# Patient Record
Sex: FEMALE | Race: BLACK OR AFRICAN AMERICAN | NOT HISPANIC OR LATINO | URBAN - METROPOLITAN AREA
[De-identification: names, ages, dates, MRNs, and addresses within clinical notes are randomized per-mention and may not be internally consistent; named-entity substitution may affect disease eponyms.]

---

## 2018-04-08 ENCOUNTER — INPATIENT (INPATIENT)
Facility: HOSPITAL | Age: 83
LOS: 4 days | Discharge: HOME | DRG: 270 | End: 2018-04-13
Attending: HOSPITALIST | Admitting: INTERNAL MEDICINE
Payer: MEDICAID

## 2018-04-08 VITALS
SYSTOLIC BLOOD PRESSURE: 141 MMHG | RESPIRATION RATE: 20 BRPM | OXYGEN SATURATION: 98 % | DIASTOLIC BLOOD PRESSURE: 87 MMHG | HEART RATE: 95 BPM

## 2018-04-08 DIAGNOSIS — I21.4 NON-ST ELEVATION (NSTEMI) MYOCARDIAL INFARCTION: ICD-10-CM

## 2018-04-08 LAB
ALBUMIN SERPL ELPH-MCNC: 3.9 G/DL — SIGNIFICANT CHANGE UP (ref 3.3–5)
ALP SERPL-CCNC: 61 U/L — SIGNIFICANT CHANGE UP (ref 40–120)
ALT FLD-CCNC: 85 U/L RC — HIGH (ref 10–45)
ANION GAP SERPL CALC-SCNC: 15 MMOL/L — SIGNIFICANT CHANGE UP (ref 5–17)
ANION GAP SERPL CALC-SCNC: 17 MMOL/L — SIGNIFICANT CHANGE UP (ref 5–17)
APTT BLD: 129 SEC — CRITICAL HIGH (ref 27.5–37.4)
APTT BLD: 28.7 SEC — SIGNIFICANT CHANGE UP (ref 27.5–37.4)
AST SERPL-CCNC: 114 U/L — HIGH (ref 10–40)
BASOPHILS # BLD AUTO: 0 K/UL — SIGNIFICANT CHANGE UP (ref 0–0.2)
BASOPHILS # BLD AUTO: 0 K/UL — SIGNIFICANT CHANGE UP (ref 0–0.2)
BASOPHILS NFR BLD AUTO: 0.3 % — SIGNIFICANT CHANGE UP (ref 0–2)
BASOPHILS NFR BLD AUTO: 0.4 % — SIGNIFICANT CHANGE UP (ref 0–2)
BILIRUB SERPL-MCNC: 0.9 MG/DL — SIGNIFICANT CHANGE UP (ref 0.2–1.2)
BUN SERPL-MCNC: 16 MG/DL — SIGNIFICANT CHANGE UP (ref 7–23)
BUN SERPL-MCNC: 17 MG/DL — SIGNIFICANT CHANGE UP (ref 7–23)
CALCIUM SERPL-MCNC: 9.4 MG/DL — SIGNIFICANT CHANGE UP (ref 8.4–10.5)
CALCIUM SERPL-MCNC: 9.9 MG/DL — SIGNIFICANT CHANGE UP (ref 8.4–10.5)
CHLORIDE SERPL-SCNC: 92 MMOL/L — LOW (ref 96–108)
CHLORIDE SERPL-SCNC: 96 MMOL/L — SIGNIFICANT CHANGE UP (ref 96–108)
CHOLEST SERPL-MCNC: 243 MG/DL — HIGH (ref 10–199)
CK MB BLD-MCNC: 4.6 % — HIGH (ref 0–3.5)
CK MB BLD-MCNC: 5.5 % — HIGH (ref 0–3.5)
CK MB CFR SERPL CALC: 19.7 NG/ML — HIGH (ref 0–3.8)
CK MB CFR SERPL CALC: 25.5 NG/ML — HIGH (ref 0–3.8)
CK SERPL-CCNC: 430 U/L — HIGH (ref 25–170)
CK SERPL-CCNC: 467 U/L — HIGH (ref 25–170)
CO2 SERPL-SCNC: 24 MMOL/L — SIGNIFICANT CHANGE UP (ref 22–31)
CO2 SERPL-SCNC: 26 MMOL/L — SIGNIFICANT CHANGE UP (ref 22–31)
CREAT SERPL-MCNC: 0.84 MG/DL — SIGNIFICANT CHANGE UP (ref 0.5–1.3)
CREAT SERPL-MCNC: 0.87 MG/DL — SIGNIFICANT CHANGE UP (ref 0.5–1.3)
EOSINOPHIL # BLD AUTO: 0.1 K/UL — SIGNIFICANT CHANGE UP (ref 0–0.5)
EOSINOPHIL # BLD AUTO: 0.2 K/UL — SIGNIFICANT CHANGE UP (ref 0–0.5)
EOSINOPHIL NFR BLD AUTO: 0.8 % — SIGNIFICANT CHANGE UP (ref 0–6)
EOSINOPHIL NFR BLD AUTO: 2.3 % — SIGNIFICANT CHANGE UP (ref 0–6)
GLUCOSE SERPL-MCNC: 124 MG/DL — HIGH (ref 70–99)
GLUCOSE SERPL-MCNC: 152 MG/DL — HIGH (ref 70–99)
HBA1C BLD-MCNC: 7.2 % — HIGH (ref 4–5.6)
HCT VFR BLD CALC: 40.8 % — SIGNIFICANT CHANGE UP (ref 34.5–45)
HCT VFR BLD CALC: 41.8 % — SIGNIFICANT CHANGE UP (ref 34.5–45)
HCT VFR BLD CALC: 43.9 % — SIGNIFICANT CHANGE UP (ref 34.5–45)
HDLC SERPL-MCNC: 54 MG/DL — SIGNIFICANT CHANGE UP (ref 40–125)
HGB BLD-MCNC: 13.5 G/DL — SIGNIFICANT CHANGE UP (ref 11.5–15.5)
HGB BLD-MCNC: 13.8 G/DL — SIGNIFICANT CHANGE UP (ref 11.5–15.5)
HGB BLD-MCNC: 14.5 G/DL — SIGNIFICANT CHANGE UP (ref 11.5–15.5)
INR BLD: 1.21 RATIO — HIGH (ref 0.88–1.16)
INR BLD: 1.26 RATIO — HIGH (ref 0.88–1.16)
LIPID PNL WITH DIRECT LDL SERPL: 177 MG/DL — HIGH
LYMPHOCYTES # BLD AUTO: 2.1 K/UL — SIGNIFICANT CHANGE UP (ref 1–3.3)
LYMPHOCYTES # BLD AUTO: 26.8 % — SIGNIFICANT CHANGE UP (ref 13–44)
LYMPHOCYTES # BLD AUTO: 3.8 K/UL — HIGH (ref 1–3.3)
LYMPHOCYTES # BLD AUTO: 49.5 % — HIGH (ref 13–44)
MAGNESIUM SERPL-MCNC: 2.1 MG/DL — SIGNIFICANT CHANGE UP (ref 1.6–2.6)
MCHC RBC-ENTMCNC: 29.7 PG — SIGNIFICANT CHANGE UP (ref 27–34)
MCHC RBC-ENTMCNC: 29.9 PG — SIGNIFICANT CHANGE UP (ref 27–34)
MCHC RBC-ENTMCNC: 30.7 PG — SIGNIFICANT CHANGE UP (ref 27–34)
MCHC RBC-ENTMCNC: 32.4 GM/DL — SIGNIFICANT CHANGE UP (ref 32–36)
MCHC RBC-ENTMCNC: 33.1 GM/DL — SIGNIFICANT CHANGE UP (ref 32–36)
MCHC RBC-ENTMCNC: 33.8 GM/DL — SIGNIFICANT CHANGE UP (ref 32–36)
MCV RBC AUTO: 90.3 FL — SIGNIFICANT CHANGE UP (ref 80–100)
MCV RBC AUTO: 90.8 FL — SIGNIFICANT CHANGE UP (ref 80–100)
MCV RBC AUTO: 91.8 FL — SIGNIFICANT CHANGE UP (ref 80–100)
MONOCYTES # BLD AUTO: 0.8 K/UL — SIGNIFICANT CHANGE UP (ref 0–0.9)
MONOCYTES # BLD AUTO: 0.9 K/UL — SIGNIFICANT CHANGE UP (ref 0–0.9)
MONOCYTES NFR BLD AUTO: 11.9 % — SIGNIFICANT CHANGE UP (ref 2–14)
MONOCYTES NFR BLD AUTO: 9.9 % — SIGNIFICANT CHANGE UP (ref 2–14)
NEUTROPHILS # BLD AUTO: 2.8 K/UL — SIGNIFICANT CHANGE UP (ref 1.8–7.4)
NEUTROPHILS # BLD AUTO: 4.8 K/UL — SIGNIFICANT CHANGE UP (ref 1.8–7.4)
NEUTROPHILS NFR BLD AUTO: 36.1 % — LOW (ref 43–77)
NEUTROPHILS NFR BLD AUTO: 62.1 % — SIGNIFICANT CHANGE UP (ref 43–77)
PHOSPHATE SERPL-MCNC: 3 MG/DL — SIGNIFICANT CHANGE UP (ref 2.5–4.5)
PLATELET # BLD AUTO: 162 K/UL — SIGNIFICANT CHANGE UP (ref 150–400)
PLATELET # BLD AUTO: 181 K/UL — SIGNIFICANT CHANGE UP (ref 150–400)
PLATELET # BLD AUTO: 187 K/UL — SIGNIFICANT CHANGE UP (ref 150–400)
POTASSIUM SERPL-MCNC: 3.8 MMOL/L — SIGNIFICANT CHANGE UP (ref 3.5–5.3)
POTASSIUM SERPL-MCNC: 7.4 MMOL/L — CRITICAL HIGH (ref 3.5–5.3)
POTASSIUM SERPL-SCNC: 3.8 MMOL/L — SIGNIFICANT CHANGE UP (ref 3.5–5.3)
POTASSIUM SERPL-SCNC: 7.4 MMOL/L — CRITICAL HIGH (ref 3.5–5.3)
PROT SERPL-MCNC: 8.2 G/DL — SIGNIFICANT CHANGE UP (ref 6–8.3)
PROTHROM AB SERPL-ACNC: 13.2 SEC — HIGH (ref 9.8–12.7)
PROTHROM AB SERPL-ACNC: 13.8 SEC — HIGH (ref 9.8–12.7)
RBC # BLD: 4.49 M/UL — SIGNIFICANT CHANGE UP (ref 3.8–5.2)
RBC # BLD: 4.55 M/UL — SIGNIFICANT CHANGE UP (ref 3.8–5.2)
RBC # BLD: 4.86 M/UL — SIGNIFICANT CHANGE UP (ref 3.8–5.2)
RBC # FLD: 13 % — SIGNIFICANT CHANGE UP (ref 10.3–14.5)
RBC # FLD: 13.1 % — SIGNIFICANT CHANGE UP (ref 10.3–14.5)
RBC # FLD: 13.1 % — SIGNIFICANT CHANGE UP (ref 10.3–14.5)
SODIUM SERPL-SCNC: 135 MMOL/L — SIGNIFICANT CHANGE UP (ref 135–145)
SODIUM SERPL-SCNC: 135 MMOL/L — SIGNIFICANT CHANGE UP (ref 135–145)
TOTAL CHOLESTEROL/HDL RATIO MEASUREMENT: 4.5 RATIO — SIGNIFICANT CHANGE UP (ref 3.3–7.1)
TRIGL SERPL-MCNC: 58 MG/DL — SIGNIFICANT CHANGE UP (ref 10–149)
TROPONIN T SERPL-MCNC: 0.91 NG/ML — HIGH (ref 0–0.06)
TROPONIN T SERPL-MCNC: 2.99 NG/ML — HIGH (ref 0–0.06)
TSH SERPL-MCNC: 2.12 UIU/ML — SIGNIFICANT CHANGE UP (ref 0.27–4.2)
WBC # BLD: 7.8 K/UL — SIGNIFICANT CHANGE UP (ref 3.8–10.5)
WBC # BLD: 7.8 K/UL — SIGNIFICANT CHANGE UP (ref 3.8–10.5)
WBC # BLD: 8.2 K/UL — SIGNIFICANT CHANGE UP (ref 3.8–10.5)
WBC # FLD AUTO: 7.8 K/UL — SIGNIFICANT CHANGE UP (ref 3.8–10.5)
WBC # FLD AUTO: 7.8 K/UL — SIGNIFICANT CHANGE UP (ref 3.8–10.5)
WBC # FLD AUTO: 8.2 K/UL — SIGNIFICANT CHANGE UP (ref 3.8–10.5)

## 2018-04-08 PROCEDURE — 93010 ELECTROCARDIOGRAM REPORT: CPT

## 2018-04-08 PROCEDURE — 71045 X-RAY EXAM CHEST 1 VIEW: CPT | Mod: 26

## 2018-04-08 PROCEDURE — 76937 US GUIDE VASCULAR ACCESS: CPT | Mod: 26

## 2018-04-08 PROCEDURE — 33967 INSERT I-AORT PERCUT DEVICE: CPT

## 2018-04-08 PROCEDURE — 93458 L HRT ARTERY/VENTRICLE ANGIO: CPT | Mod: 26,59

## 2018-04-08 PROCEDURE — 99291 CRITICAL CARE FIRST HOUR: CPT

## 2018-04-08 PROCEDURE — 92941 PRQ TRLML REVSC TOT OCCL AMI: CPT | Mod: LD

## 2018-04-08 RX ORDER — TICAGRELOR 90 MG/1
180 TABLET ORAL ONCE
Qty: 0 | Refills: 0 | Status: COMPLETED | OUTPATIENT
Start: 2018-04-08 | End: 2018-04-08

## 2018-04-08 RX ORDER — ATORVASTATIN CALCIUM 80 MG/1
80 TABLET, FILM COATED ORAL AT BEDTIME
Qty: 0 | Refills: 0 | Status: DISCONTINUED | OUTPATIENT
Start: 2018-04-08 | End: 2018-04-12

## 2018-04-08 RX ORDER — ASPIRIN/CALCIUM CARB/MAGNESIUM 324 MG
81 TABLET ORAL DAILY
Qty: 0 | Refills: 0 | Status: DISCONTINUED | OUTPATIENT
Start: 2018-04-08 | End: 2018-04-13

## 2018-04-08 RX ORDER — FUROSEMIDE 40 MG
40 TABLET ORAL ONCE
Qty: 0 | Refills: 0 | Status: COMPLETED | OUTPATIENT
Start: 2018-04-08 | End: 2018-04-08

## 2018-04-08 RX ORDER — CLOPIDOGREL BISULFATE 75 MG/1
75 TABLET, FILM COATED ORAL DAILY
Qty: 0 | Refills: 0 | Status: DISCONTINUED | OUTPATIENT
Start: 2018-04-08 | End: 2018-04-13

## 2018-04-08 RX ORDER — HEPARIN SODIUM 5000 [USP'U]/ML
INJECTION INTRAVENOUS; SUBCUTANEOUS
Qty: 25000 | Refills: 0 | Status: DISCONTINUED | OUTPATIENT
Start: 2018-04-08 | End: 2018-04-09

## 2018-04-08 RX ORDER — HEPARIN SODIUM 5000 [USP'U]/ML
3000 INJECTION INTRAVENOUS; SUBCUTANEOUS EVERY 6 HOURS
Qty: 0 | Refills: 0 | Status: DISCONTINUED | OUTPATIENT
Start: 2018-04-08 | End: 2018-04-09

## 2018-04-08 RX ORDER — HEPARIN SODIUM 5000 [USP'U]/ML
6500 INJECTION INTRAVENOUS; SUBCUTANEOUS EVERY 6 HOURS
Qty: 0 | Refills: 0 | Status: DISCONTINUED | OUTPATIENT
Start: 2018-04-08 | End: 2018-04-09

## 2018-04-08 RX ADMIN — HEPARIN SODIUM 1400 UNIT(S)/HR: 5000 INJECTION INTRAVENOUS; SUBCUTANEOUS at 16:40

## 2018-04-08 RX ADMIN — TICAGRELOR 180 MILLIGRAM(S): 90 TABLET ORAL at 12:18

## 2018-04-08 RX ADMIN — ATORVASTATIN CALCIUM 80 MILLIGRAM(S): 80 TABLET, FILM COATED ORAL at 23:18

## 2018-04-08 RX ADMIN — Medication 40 MILLIGRAM(S): at 12:10

## 2018-04-08 RX ADMIN — HEPARIN SODIUM 0 UNIT(S)/HR: 5000 INJECTION INTRAVENOUS; SUBCUTANEOUS at 23:59

## 2018-04-08 NOTE — H&P ADULT - NSHPLABSRESULTS_GEN_ALL_CORE
14.5   7.8   )-----------( 187      ( 08 Apr 2018 16:46 )             43.9     04-08    135  |  92<L>  |  17  ----------------------------<  152<H>  3.8   |  26  |  0.87    Ca    9.9      08 Apr 2018 16:46  Phos  3.0     04-08  Mg     2.1     04-08    TPro  8.2  /  Alb  3.9  /  TBili  0.9  /  DBili  x   /  AST  114<H>  /  ALT  85<H>  /  AlkPhos  61  04-08      PT/INR - ( 08 Apr 2018 17:21 )   PT: 13.8 sec;   INR: 1.26 ratio    PTT - ( 08 Apr 2018 12:10 )  PTT:28.7 sec 14.5   7.8   )-----------( 187      ( 08 Apr 2018 16:46 )             43.9     04-08    135  |  92<L>  |  17  ----------------------------<  152<H>  3.8   |  26  |  0.87    Ca    9.9      08 Apr 2018 16:46  Phos  3.0     04-08  Mg     2.1     04-08    TPro  8.2  /  Alb  3.9  /  TBili  0.9  /  DBili  x   /  AST  114<H>  /  ALT  85<H>  /  AlkPhos  61  04-08    PT/INR - ( 08 Apr 2018 17:21 )   PT: 13.8 sec;   INR: 1.26 ratio    PTT - ( 08 Apr 2018 12:10 )  PTT:28.7 sec

## 2018-04-08 NOTE — ED PROVIDER NOTE - OBJECTIVE STATEMENT
Intermittant L chest pain 4 days worse since yest associated w sob. epigastric before now L chest. no vomiting or diaphoresis. got asa, plavix and has nitro patch on chest now. sent for cath from [facility] , cards as accepting. DM HTN Intermittant L chest pain 1-2 days worse since yest associated w sob. epigastric before now L chest. no vomiting or diaphoresis. got asa, plavix and has nitro patch on chest now. sent for cath from [facility] , cards as accepting.

## 2018-04-08 NOTE — H&P ADULT - NSHPPHYSICALEXAM_GEN_ALL_CORE
ICU Vital Signs Last 24 Hrs  T(C): 36.8 (08 Apr 2018 20:00), Max: 36.8 (08 Apr 2018 20:00)  T(F): 98.3 (08 Apr 2018 20:00), Max: 98.3 (08 Apr 2018 20:00)  HR: 98 (08 Apr 2018 21:00) (88 - 101)  BP: 132/76 (08 Apr 2018 14:45) (125/63 - 154/98)  BP(mean): 100 (08 Apr 2018 14:45) (79 - 100)  ABP: --  ABP(mean): --  RR: 22 (08 Apr 2018 21:00) (18 - 22)  SpO2: 95% (08 Apr 2018 21:00) (92% - 100%)      GENERAL: AOx3, resting comfortably, mild distress due to left leg cramp   HEENT: pupils equal & reactive, no scleral icterus  NECK: supple, no JVD appreciated   CARDIAC: S1 & S2 present, regular rhythm, no murmurs/rubs/gallops  CHEST: breath sounds clear & equal anteriorly, no wheezes, no crackles at bases   ABDOMEN: Soft, non-distended, bowel sounds present, non-tender   NEURO: CN II-XII grossly intact, no focal deficits  MSK: Strength appropriate in 3/4 extremities +RLE immobilized with IABP +LLE muscle spasm  SKIN: Warm and dry +IABP in R. groin without bleeding at site   EXT: Cool below ankle, no LE edema, 1+ DP B/L

## 2018-04-08 NOTE — ED PROVIDER NOTE - PHYSICAL EXAMINATION
Gen: well appearing, of stated age, no acute distress; Head: NC, AT; ENT: MMM, no uvular deviation; Neck: supple with full ROM; Chest: CTAB, no retractions, rate normal, appears to breath comfortable, Nitro Patch; Heart: tachy reg S1S2 +JVD minimal peripheral edema b/l pulses 2+ in arms and legs; Abd: Soft non-tender, no rebound or guarding, no masses, no jimenes sign, no mcburney tenderness, no CVAT; Back: No spinal deformity; Ext: Moving all 4 limbs without obvious impairment to ROM, no obvious weakness; Neuro: fluid speech, no focal deficits, oriented to person, place, situation; Psych: No anxiety, depression or pressured speech noted; Skin: no utricaria, no diffuse rash. -ncohen Gen: well appearing, of stated age, no acute distress; Head: NC, AT; ENT: MMM, no uvular deviation; Neck: supple with full ROM; Chest: CTAB except basilar rales bl, no retractions, rate normal, appears to breath comfortable, Nitro Patch; Heart: tachy reg S1S2 +JVD minimal peripheral edema b/l pulses 2+ in arms and legs; Abd: Soft non-tender, no rebound or guarding, no masses, no jimenes sign, no mcburney tenderness, no CVAT; Back: No spinal deformity; Ext: Moving all 4 limbs without obvious impairment to ROM, no obvious weakness; Neuro: fluid speech, no focal deficits, oriented to person, place, situation; Psych: No anxiety, depression or pressured speech noted; Skin: no utricaria, no diffuse rash. -ncohen

## 2018-04-08 NOTE — H&P ADULT - HISTORY OF PRESENT ILLNESS
85F with history of pre-diabetes and HTN presents as transfer from [facility] for STEMI and emergent LHC. Patient initially presented to OSH for shortness of breath accompanied by epigastric abdominal pain for one day duration. Patient lives with son who provided translation at bedside. He noted patient gasping for breath yesterday afternoon complaining of dyspepsia and  epigastric abdominal pain temporarily relieved with Maalox. Patient had similar symptoms approx. 2 weeks ago that also resolved with OTC management. At 4 am today, son noted patient to have difficulty with breathing, appearing in more distress than earlier that day. She had not experienced subjective fevers, chills, nausea, vomiting or changes in appetite. She had intermittent chest pain approx. 1 month ago and went to PMD where EKG reportedly did not show anything of concern. She is a non-smoker with no prior cardiac history. Family history non-contributory.      At [place], patient endorsed left-sided chest pain along with shortness of breath, found with Q waves in leads III, avF as well as TWI in lead II,III and avF. Patient transferred to [facility] Cath lab and underwent PCI with RUBEN x2 to pLAD via R. femoral access. LHC also revealed EF 30% and LVEDP 35 for which she was given Lasix 40 IV x1. Cardiac enzymes markedly elevated upon transfer to CCU however symptoms of left-sided chest pain, epigastric discomfort and shortness of breath had resolved. Patient endorsed left lower extremity 85F with history of pre-diabetes and HTN presents as transfer from [facility] for STEMI and emergent LHC. Patient initially presented to OSH for shortness of breath accompanied by epigastric abdominal pain for one day duration. Patient lives with son who provided translation at bedside. He noted patient gasping for breath yesterday afternoon complaining of dyspepsia and  epigastric abdominal pain temporarily relieved with Maalox. Patient had similar symptoms approx. 2 weeks ago that also resolved with OTC management. At 4 am today, son noted patient to have difficulty with breathing, appearing in more distress than earlier that day. She had not experienced subjective fevers, chills, nausea, vomiting or changes in appetite. She had intermittent chest pain approx. 1 month ago and went to PMD where EKG reportedly did not show anything of concern. She is a non-smoker with no prior cardiac history. Family history non-contributory.      At [place], patient endorsed left-sided chest pain along with shortness of breath, found with Q waves in leads III, avF as well as TWI in lead II,III and avF. Patient given ASA/Plavix load, and nitroglycerin patch. Patient transferred to [facility] Cath lab and [facility]. LHC also revealed EF 30% and LVEDP 35 for which she was given Lasix 40 IV x1. Cardiac enzymes markedly elevated upon transfer to CCU however symptoms of left-sided chest pain, epigastric discomfort and shortness of breath had resolved. Patient endorsed left lower extremity 85F with history of pre-diabetes and HTN presents as transfer from [facility] for NSTEMI and emergent LHC. Patient initially presented to OSH for shortness of breath accompanied by epigastric abdominal pain for one day duration. Patient lives with son who provided translation at bedside. He noted patient gasping for breath yesterday afternoon complaining of dyspepsia and  epigastric abdominal pain temporarily relieved with Maalox. Patient had similar symptoms approx. 2 weeks ago that also resolved with OTC management. At 4 am today, son noted patient to have difficulty with breathing, appearing in more distress than earlier that day. She had not experienced subjective fevers, chills, nausea, vomiting or changes in appetite. She had intermittent chest pain approx. 1 month ago and went to PMD where EKG reportedly did not show anything of concern. She is a non-smoker with no prior cardiac history. Family history non-contributory.      At [place], patient endorsed left-sided chest pain along with shortness of breath, found with Q waves in leads III, avF as well as TWI in lead II,III and avF. Patient given ASA/Plavix load, and nitroglycerin patch. Patient transferred to [facility] Cath lab and [facility]. LHC also revealed EF 30% and LVEDP 35 for which she was given Lasix 40 IV x1. Cardiac enzymes markedly elevated upon transfer to CCU however symptoms of left-sided chest pain, epigastric discomfort and shortness of breath had resolved. Patient endorsed left lower extremity 85 Creole-speaking woman with history of pre-diabetes and HTN presents as transfer from [facility] for NSTEMI and emergent LHC. Patient initially presented to OSH for shortness of breath accompanied by epigastric abdominal pain for one day duration. Patient lives with son who provided translation at bedside. He noted patient gasping for breath yesterday afternoon complaining of dyspepsia and  epigastric abdominal pain temporarily relieved with Maalox. Patient had similar symptoms approx. 2 weeks ago that also resolved with OTC management. At 4 am today, son noted patient to have difficulty with breathing, appearing in more distress than earlier that day. She had not experienced subjective fevers, chills, nausea, vomiting or changes in appetite. She had intermittent chest pain approx. 1 month ago and went to PMD where EKG reportedly did not show anything of concern. She is a non-smoker with no prior cardiac history. Family history non-contributory.      At [place], patient endorsed left-sided chest pain along with shortness of breath, found with Q waves in leads III, avF as well as TWI in lead II,III and avF. Patient given ASA/Plavix load, and nitroglycerin patch. Patient transferred to [facility] Cath lab and underwent PCI with RUBNE x2 to [facility] via R. femoral access. LHC also revealed EF 30% and LVEDP 35 for which she was given Lasix 40 IV x1. Cardiac enzymes markedly elevated upon transfer to CCU --> 467; trop 0.91 --> 2.99, CKMB 19.1 --> 25.5.  Left-sided chest pain, epigastric discomfort and shortness of breath now resolved. Patient only complaint is left lower extremity muscle spasm with pain. 85 Creole-speaking woman with history of pre-diabetes and HTN presents as transfer from [facility] for NSTEMI and emergent LHC. Patient initially presented to OSH for shortness of breath accompanied by epigastric abdominal pain for one day duration. Patient lives with son who provided translation at bedside. He noted patient gasping for breath yesterday afternoon complaining of dyspepsia and  epigastric abdominal pain temporarily relieved with Maalox. Patient had similar symptoms approx. 2 weeks ago that also resolved with OTC management. At 4 am today, son noted patient to have difficulty with breathing, appearing in more distress than earlier that day. She had not experienced subjective fevers, chills, nausea, vomiting or changes in appetite. She had intermittent chest pain approx. 1 month ago and went to PMD where EKG reportedly did not show anything of concern. She is a non-smoker with no prior cardiac history. Family history non-contributory.      At [place], patient endorsed left-sided chest pain along with shortness of breath, found with Q waves in leads III, avF; TWI in lead II,III, avF. V5-V6. Patient given ASA/Plavix load and nitroglycerin patch prior to transfer to [facility]. Patient  underwent PCI with RUBEN x2 to [facility] via R. femoral access with placement of intra-aortic balloon pump. LHC also revealed EF 30% and LVEDP 35 for which she was given Lasix 40 IV x1. Cardiac enzymes markedly elevated upon transfer to CCU --> 467; trop 0.91 --> 2.99, CKMB 19.1 --> 25.5.  Left-sided chest pain, epigastric discomfort and shortness of breath now resolved. Patient only complaint is left lower extremity muscle spasm with pain.

## 2018-04-08 NOTE — ED PROVIDER NOTE - NS ED ROS FT
ROS: As noted in HPI, otherwise below --  Constitutional symptoms: Negative  Eyes: Negative  Ears, Nose, Mouth, Throat: Negative  Cardiovascular: +  Respiratory: +  Gastrointestinal: Negative  Genitourinary: Negative  Musculoskeletal: Negative  Integumentary: Negative  Neurological: Negative  Psychiatric: Negative  Endocrine: Negative  Allergic/Immunologic: Negative

## 2018-04-08 NOTE — H&P ADULT - NSHPSOCIALHISTORY_GEN_ALL_CORE
Patient [place]-born, immigrated to [place] years ago. Lifelong non-smoker, no alcohol use or other toxic habits. Resides at home with son and daughter-in-law, capable of all basic ADLs at baseline.

## 2018-04-08 NOTE — ED PROVIDER NOTE - MEDICAL DECISION MAKING DETAILS
concern for nstemi, cards at bedside for cath consultation. ekg, repeat trop, review [facility] ekg - this one is concerning for STEMI however ekg now is not stemi.

## 2018-04-08 NOTE — ED ADULT NURSE NOTE - OBJECTIVE STATEMENT
pt transferred from [place] in [place] for c/o intermittant L sided chest pain for past 2 dasy. pt was transferred for catherization. pt receiced asa, plavix @ [facility]. pt is creole speaking. pt was accepted by cardiology. pt transferred from [place] in [place] for c/o intermittant L sided chest pain for past 2 dasy. pt was transferred for catherization. pt receiced asa, plavix @ [facility]. pt is creole speaking. pt was accepted by cardiology. iv placed L hand labs obtained and sent pt med with lasix and brilinta as ordered. pt seen by cardiology fellow. pt transported to cath lab via [facility] on CM  @12:20

## 2018-04-08 NOTE — ED PROVIDER NOTE - PROGRESS NOTE DETAILS
d/w cards here at bedside. given that pt already treated and still w pain will go to cath lab. advised for poss aortogram. active ekg changes from [facility].

## 2018-04-08 NOTE — H&P ADULT - ASSESSMENT
85 Creole-speaking woman with history of pre-diabetes and HTN presents as transfer from [facility] for NSTEMI and emergent LHC revealing triple vessel disease with RUBEN x2 to pLAD and IABP placement pending possible staged PCI.     # Cardio    # Pulm    # Renal    # MSK  Left leg spasms - etiology unclear, replete     # Endo  Pre-diabetes - not on oral medications at home, HbA1c     #DVT ppx: On heparin drip for 85 Creole-speaking woman with history of pre-diabetes and HTN presents as transfer from [facility] for NSTEMI and emergent LHC revealing triple vessel disease with RUBEN x2 to pLAD and IABP placement pending possible staged PCI.     # Cardio  NSTEMI - TWI in II, III and avF with Q waves therefore likely late-presentation, LHC revealed triple-vessel disease, RUBEN x2 to pLAD with possible staged PCI planned  - IABP placed in R. groin given triple vessel disease with good augmentation of blood pressures   - s/p ASA and Plavix load at OSH; cont ASA 81, Plavix 75 as patient on heparin drip for IABP  - needs ACEi, beta blocker    # Pulm    # Renal    # MSK  Left leg spasms - etiology unclear, replete     # Endo  Pre-diabetes - not on oral medications at home, HbA1c     #DVT ppx: On heparin drip for 85 Creole-speaking woman with history of pre-diabetes and HTN presents as transfer from [facility] for NSTEMI and emergent LHC revealing triple vessel disease with RUBEN x2 to pLAD and IABP placement pending possible staged PCI.     # Cardio  NSTEMI - TWI in II, III and avF with Q waves therefore likely late-presentation, LHC revealed triple-vessel disease, RUBEN x2 to pLAD with possible staged PCI planned  - IABP placed in R. groin given triple vessel disease with good augmentation of blood pressures; cont heparin drip    - s/p ASA and Plavix load at OSH; cont ASA 81, Plavix 75 as patient on heparin drip for IABP  - needs ACEi, beta blocker    # Pulm  Dyspnea - likely secondary to volume overload in setting of NSTEMI with elevated LVEDP 30, given Lasix 40 IV with resolution  - infectious etiology less likely given absence of cough, infiltrates on CXR    # MSK  Left leg spasms - etiology unclear, resolved spontaneously; cont monitor, replete electrolytes PRN    # Endo  Pre-diabetes - not on oral medications at home, HbA1c 6.2; monitor FSG with ISS    #DVT ppx: anticoagulated with heparin drip     P Hudson SANTANA-PGY2  CCU/Internal Medicine 85 Creole-speaking woman with history of pre-diabetes and HTN presents as transfer from [facility] for NSTEMI and emergent LHC revealing triple vessel disease with RUBEN x2 to pLAD and IABP placement pending possible staged PCI.     # Cardio  NSTEMI - TWI in II, III and avF with Q waves possibly late-presentation; LHC revealed triple-vessel disease s/p RUBEN x2 to pLAD with possible staged PCI planned  - IABP placed in R. groin in setting of triple vessel disease, good augmentation of blood pressures; cont heparin drip    - s/p ASA and Plavix load at OSH; cont ASA 81, Plavix 75 as patient on heparin drip for IABP  - needs ACEi, beta blocker  Hypertension - normotensive with augmented blood pressures  - clarify outpatient antihypertensives with family in AM    # Pulm  Dyspnea - likely secondary to volume overload in setting of NSTEMI with elevated LVEDP 30, given Lasix 40 IV with resolution  - infectious etiology less likely given absence of cough, infiltrates on CXR    # MSK  Left leg spasms - etiology unclear, resolved spontaneously; cont monitor, replete electrolytes PRN    # Endo  Pre-diabetes - not on oral medications at home, HbA1c 6.2; monitor FSG with ISS    #DVT ppx: anticoagulated with heparin drip     P Hudson SANTANA-PGY2  CCU/Internal Medicine 85 Creole-speaking woman with history of pre-diabetes and HTN presents as transfer from [facility] for NSTEMI and emergent LHC revealing triple vessel disease with RUBEN x2 to pLAD and IABP placement pending possible staged PCI.     # Cardio  NSTEMI - TWI in II, III and avF with Q waves possibly late-presentation; LHC revealed triple-vessel disease s/p RUBEN x2 to pLAD with possible staged PCI planned  - IABP placed in R. groin in setting of triple vessel disease with good augmentation of blood pressures; cont heparin drip    - s/p ASA and Plavix load at OSH; cont ASA 81, Plavix 75 as patient on heparin drip for IABP  - cardiac enzymes uptrending (trop 0.91 --> 2.99); cont to trend until peak  - needs ACEi, beta blocker  Hypertension - normotensive with augmented blood pressures  - clarify outpatient antihypertensives with family in AM    # Pulm  Dyspnea - likely secondary to volume overload in setting of NSTEMI with elevated LVEDP 30, given Lasix 40 IV with resolution  - infectious etiology less likely given absence of cough, infiltrates on CXR    # MSK  Left leg spasms - etiology unclear, resolved spontaneously; cont monitor, replete electrolytes PRN    # Endo  Pre-diabetes - not on oral medications at home, HbA1c 6.2; monitor FSG with ISS    #DVT ppx: anticoagulated with heparin drip     P Hudson SANTANA-PGY2  CCU/Internal Medicine

## 2018-04-08 NOTE — H&P ADULT - NSHPREVIEWOFSYSTEMS_GEN_ALL_CORE
CONST: no fevers, no chills, no appetite changes  ENT: no sore throat   CV: no chest pain, no palpitations, no dizziness   RESP: no shortness of breath, no cough  ABD: no abdominal pain, no nausea, no constipation, no diarrhea    : no dysuria  MSK: no back pain +left leg cramping    NEURO: no headache or additional neurologic complaints  HEME: no easy bleeding  SKIN:  no rash

## 2018-04-09 LAB
ALBUMIN SERPL ELPH-MCNC: 3.9 G/DL — SIGNIFICANT CHANGE UP (ref 3.3–5)
ALBUMIN SERPL ELPH-MCNC: 4 G/DL — SIGNIFICANT CHANGE UP (ref 3.3–5)
ALP SERPL-CCNC: 64 U/L — SIGNIFICANT CHANGE UP (ref 40–120)
ALP SERPL-CCNC: 67 U/L — SIGNIFICANT CHANGE UP (ref 40–120)
ALT FLD-CCNC: 66 U/L RC — HIGH (ref 10–45)
ALT FLD-CCNC: 70 U/L RC — HIGH (ref 10–45)
ANION GAP SERPL CALC-SCNC: 15 MMOL/L — SIGNIFICANT CHANGE UP (ref 5–17)
ANION GAP SERPL CALC-SCNC: 17 MMOL/L — SIGNIFICANT CHANGE UP (ref 5–17)
ANION GAP SERPL CALC-SCNC: 19 MMOL/L — HIGH (ref 5–17)
APTT BLD: 70.6 SEC — HIGH (ref 27.5–37.4)
AST SERPL-CCNC: 60 U/L — HIGH (ref 10–40)
AST SERPL-CCNC: 78 U/L — HIGH (ref 10–40)
BASOPHILS # BLD AUTO: 0 K/UL — SIGNIFICANT CHANGE UP (ref 0–0.2)
BASOPHILS # BLD AUTO: 0 K/UL — SIGNIFICANT CHANGE UP (ref 0–0.2)
BASOPHILS NFR BLD AUTO: 0.2 % — SIGNIFICANT CHANGE UP (ref 0–2)
BASOPHILS NFR BLD AUTO: 0.3 % — SIGNIFICANT CHANGE UP (ref 0–2)
BILIRUB SERPL-MCNC: 0.7 MG/DL — SIGNIFICANT CHANGE UP (ref 0.2–1.2)
BILIRUB SERPL-MCNC: 1 MG/DL — SIGNIFICANT CHANGE UP (ref 0.2–1.2)
BLD GP AB SCN SERPL QL: NEGATIVE — SIGNIFICANT CHANGE UP
BUN SERPL-MCNC: 19 MG/DL — SIGNIFICANT CHANGE UP (ref 7–23)
BUN SERPL-MCNC: 19 MG/DL — SIGNIFICANT CHANGE UP (ref 7–23)
BUN SERPL-MCNC: 27 MG/DL — HIGH (ref 7–23)
CALCIUM SERPL-MCNC: 9.1 MG/DL — SIGNIFICANT CHANGE UP (ref 8.4–10.5)
CALCIUM SERPL-MCNC: 9.4 MG/DL — SIGNIFICANT CHANGE UP (ref 8.4–10.5)
CALCIUM SERPL-MCNC: 9.6 MG/DL — SIGNIFICANT CHANGE UP (ref 8.4–10.5)
CHLORIDE SERPL-SCNC: 92 MMOL/L — LOW (ref 96–108)
CHLORIDE SERPL-SCNC: 93 MMOL/L — LOW (ref 96–108)
CHLORIDE SERPL-SCNC: 93 MMOL/L — LOW (ref 96–108)
CK MB BLD-MCNC: 2.2 % — SIGNIFICANT CHANGE UP (ref 0–3.5)
CK MB BLD-MCNC: 2.6 % — SIGNIFICANT CHANGE UP (ref 0–3.5)
CK MB BLD-MCNC: 4.1 % — HIGH (ref 0–3.5)
CK MB CFR SERPL CALC: 10.5 NG/ML — HIGH (ref 0–3.8)
CK MB CFR SERPL CALC: 17 NG/ML — HIGH (ref 0–3.8)
CK MB CFR SERPL CALC: 7.1 NG/ML — HIGH (ref 0–3.8)
CK SERPL-CCNC: 322 U/L — HIGH (ref 25–170)
CK SERPL-CCNC: 397 U/L — HIGH (ref 25–170)
CK SERPL-CCNC: 414 U/L — HIGH (ref 25–170)
CO2 SERPL-SCNC: 23 MMOL/L — SIGNIFICANT CHANGE UP (ref 22–31)
CO2 SERPL-SCNC: 26 MMOL/L — SIGNIFICANT CHANGE UP (ref 22–31)
CO2 SERPL-SCNC: 26 MMOL/L — SIGNIFICANT CHANGE UP (ref 22–31)
CREAT SERPL-MCNC: 0.92 MG/DL — SIGNIFICANT CHANGE UP (ref 0.5–1.3)
CREAT SERPL-MCNC: 0.98 MG/DL — SIGNIFICANT CHANGE UP (ref 0.5–1.3)
CREAT SERPL-MCNC: 1.2 MG/DL — SIGNIFICANT CHANGE UP (ref 0.5–1.3)
EOSINOPHIL # BLD AUTO: 0.3 K/UL — SIGNIFICANT CHANGE UP (ref 0–0.5)
EOSINOPHIL # BLD AUTO: 0.4 K/UL — SIGNIFICANT CHANGE UP (ref 0–0.5)
EOSINOPHIL NFR BLD AUTO: 2.9 % — SIGNIFICANT CHANGE UP (ref 0–6)
EOSINOPHIL NFR BLD AUTO: 3.4 % — SIGNIFICANT CHANGE UP (ref 0–6)
GLUCOSE BLDC GLUCOMTR-MCNC: 139 MG/DL — HIGH (ref 70–99)
GLUCOSE BLDC GLUCOMTR-MCNC: 142 MG/DL — HIGH (ref 70–99)
GLUCOSE BLDC GLUCOMTR-MCNC: 185 MG/DL — HIGH (ref 70–99)
GLUCOSE SERPL-MCNC: 135 MG/DL — HIGH (ref 70–99)
GLUCOSE SERPL-MCNC: 139 MG/DL — HIGH (ref 70–99)
GLUCOSE SERPL-MCNC: 200 MG/DL — HIGH (ref 70–99)
HCT VFR BLD CALC: 40.6 % — SIGNIFICANT CHANGE UP (ref 34.5–45)
HCT VFR BLD CALC: 41.5 % — SIGNIFICANT CHANGE UP (ref 34.5–45)
HGB BLD-MCNC: 13.4 G/DL — SIGNIFICANT CHANGE UP (ref 11.5–15.5)
HGB BLD-MCNC: 13.6 G/DL — SIGNIFICANT CHANGE UP (ref 11.5–15.5)
INR BLD: 1.25 RATIO — HIGH (ref 0.88–1.16)
LYMPHOCYTES # BLD AUTO: 2.5 K/UL — SIGNIFICANT CHANGE UP (ref 1–3.3)
LYMPHOCYTES # BLD AUTO: 2.9 K/UL — SIGNIFICANT CHANGE UP (ref 1–3.3)
LYMPHOCYTES # BLD AUTO: 27.1 % — SIGNIFICANT CHANGE UP (ref 13–44)
LYMPHOCYTES # BLD AUTO: 28.4 % — SIGNIFICANT CHANGE UP (ref 13–44)
MAGNESIUM SERPL-MCNC: 2.2 MG/DL — SIGNIFICANT CHANGE UP (ref 1.6–2.6)
MAGNESIUM SERPL-MCNC: 2.3 MG/DL — SIGNIFICANT CHANGE UP (ref 1.6–2.6)
MCHC RBC-ENTMCNC: 29.8 PG — SIGNIFICANT CHANGE UP (ref 27–34)
MCHC RBC-ENTMCNC: 29.9 PG — SIGNIFICANT CHANGE UP (ref 27–34)
MCHC RBC-ENTMCNC: 32.9 GM/DL — SIGNIFICANT CHANGE UP (ref 32–36)
MCHC RBC-ENTMCNC: 32.9 GM/DL — SIGNIFICANT CHANGE UP (ref 32–36)
MCV RBC AUTO: 90.6 FL — SIGNIFICANT CHANGE UP (ref 80–100)
MCV RBC AUTO: 90.9 FL — SIGNIFICANT CHANGE UP (ref 80–100)
MONOCYTES # BLD AUTO: 1.2 K/UL — HIGH (ref 0–0.9)
MONOCYTES # BLD AUTO: 1.3 K/UL — HIGH (ref 0–0.9)
MONOCYTES NFR BLD AUTO: 12.3 % — SIGNIFICANT CHANGE UP (ref 2–14)
MONOCYTES NFR BLD AUTO: 12.9 % — SIGNIFICANT CHANGE UP (ref 2–14)
NEUTROPHILS # BLD AUTO: 5.3 K/UL — SIGNIFICANT CHANGE UP (ref 1.8–7.4)
NEUTROPHILS # BLD AUTO: 5.8 K/UL — SIGNIFICANT CHANGE UP (ref 1.8–7.4)
NEUTROPHILS NFR BLD AUTO: 55.7 % — SIGNIFICANT CHANGE UP (ref 43–77)
NEUTROPHILS NFR BLD AUTO: 56.8 % — SIGNIFICANT CHANGE UP (ref 43–77)
PHOSPHATE SERPL-MCNC: 4.1 MG/DL — SIGNIFICANT CHANGE UP (ref 2.5–4.5)
PHOSPHATE SERPL-MCNC: 4.4 MG/DL — SIGNIFICANT CHANGE UP (ref 2.5–4.5)
PLATELET # BLD AUTO: 163 K/UL — SIGNIFICANT CHANGE UP (ref 150–400)
PLATELET # BLD AUTO: 169 K/UL — SIGNIFICANT CHANGE UP (ref 150–400)
POTASSIUM SERPL-MCNC: 3.8 MMOL/L — SIGNIFICANT CHANGE UP (ref 3.5–5.3)
POTASSIUM SERPL-MCNC: 4.2 MMOL/L — SIGNIFICANT CHANGE UP (ref 3.5–5.3)
POTASSIUM SERPL-MCNC: 4.3 MMOL/L — SIGNIFICANT CHANGE UP (ref 3.5–5.3)
POTASSIUM SERPL-SCNC: 3.8 MMOL/L — SIGNIFICANT CHANGE UP (ref 3.5–5.3)
POTASSIUM SERPL-SCNC: 4.2 MMOL/L — SIGNIFICANT CHANGE UP (ref 3.5–5.3)
POTASSIUM SERPL-SCNC: 4.3 MMOL/L — SIGNIFICANT CHANGE UP (ref 3.5–5.3)
PROT SERPL-MCNC: 7.8 G/DL — SIGNIFICANT CHANGE UP (ref 6–8.3)
PROT SERPL-MCNC: 7.9 G/DL — SIGNIFICANT CHANGE UP (ref 6–8.3)
PROTHROM AB SERPL-ACNC: 13.7 SEC — HIGH (ref 9.8–12.7)
RBC # BLD: 4.47 M/UL — SIGNIFICANT CHANGE UP (ref 3.8–5.2)
RBC # BLD: 4.58 M/UL — SIGNIFICANT CHANGE UP (ref 3.8–5.2)
RBC # FLD: 12.9 % — SIGNIFICANT CHANGE UP (ref 10.3–14.5)
RBC # FLD: 13.2 % — SIGNIFICANT CHANGE UP (ref 10.3–14.5)
RH IG SCN BLD-IMP: POSITIVE — SIGNIFICANT CHANGE UP
SODIUM SERPL-SCNC: 133 MMOL/L — LOW (ref 135–145)
SODIUM SERPL-SCNC: 135 MMOL/L — SIGNIFICANT CHANGE UP (ref 135–145)
SODIUM SERPL-SCNC: 136 MMOL/L — SIGNIFICANT CHANGE UP (ref 135–145)
TROPONIN T SERPL-MCNC: 1.49 NG/ML — HIGH (ref 0–0.06)
TROPONIN T SERPL-MCNC: 1.52 NG/ML — HIGH (ref 0–0.06)
TROPONIN T SERPL-MCNC: 1.61 NG/ML — HIGH (ref 0–0.06)
WBC # BLD: 10.3 K/UL — SIGNIFICANT CHANGE UP (ref 3.8–10.5)
WBC # BLD: 9.3 K/UL — SIGNIFICANT CHANGE UP (ref 3.8–10.5)
WBC # FLD AUTO: 10.3 K/UL — SIGNIFICANT CHANGE UP (ref 3.8–10.5)
WBC # FLD AUTO: 9.3 K/UL — SIGNIFICANT CHANGE UP (ref 3.8–10.5)

## 2018-04-09 PROCEDURE — 93010 ELECTROCARDIOGRAM REPORT: CPT | Mod: 77

## 2018-04-09 PROCEDURE — 71045 X-RAY EXAM CHEST 1 VIEW: CPT | Mod: 26

## 2018-04-09 PROCEDURE — 92928 PRQ TCAT PLMT NTRAC ST 1 LES: CPT | Mod: RC

## 2018-04-09 PROCEDURE — 93306 TTE W/DOPPLER COMPLETE: CPT | Mod: 26

## 2018-04-09 PROCEDURE — 93010 ELECTROCARDIOGRAM REPORT: CPT

## 2018-04-09 PROCEDURE — 99291 CRITICAL CARE FIRST HOUR: CPT

## 2018-04-09 RX ORDER — ACETAMINOPHEN 500 MG
1000 TABLET ORAL ONCE
Qty: 0 | Refills: 0 | Status: COMPLETED | OUTPATIENT
Start: 2018-04-09 | End: 2018-04-09

## 2018-04-09 RX ORDER — DEXTROSE 50 % IN WATER 50 %
1 SYRINGE (ML) INTRAVENOUS ONCE
Qty: 0 | Refills: 0 | Status: DISCONTINUED | OUTPATIENT
Start: 2018-04-09 | End: 2018-04-13

## 2018-04-09 RX ORDER — METOPROLOL TARTRATE 50 MG
12.5 TABLET ORAL
Qty: 0 | Refills: 0 | Status: DISCONTINUED | OUTPATIENT
Start: 2018-04-09 | End: 2018-04-09

## 2018-04-09 RX ORDER — INSULIN LISPRO 100/ML
VIAL (ML) SUBCUTANEOUS
Qty: 0 | Refills: 0 | Status: DISCONTINUED | OUTPATIENT
Start: 2018-04-09 | End: 2018-04-13

## 2018-04-09 RX ORDER — CLOPIDOGREL BISULFATE 75 MG/1
300 TABLET, FILM COATED ORAL ONCE
Qty: 0 | Refills: 0 | Status: COMPLETED | OUTPATIENT
Start: 2018-04-09 | End: 2018-04-09

## 2018-04-09 RX ORDER — DEXTROSE 50 % IN WATER 50 %
12.5 SYRINGE (ML) INTRAVENOUS ONCE
Qty: 0 | Refills: 0 | Status: DISCONTINUED | OUTPATIENT
Start: 2018-04-09 | End: 2018-04-13

## 2018-04-09 RX ORDER — SODIUM CHLORIDE 9 MG/ML
1000 INJECTION, SOLUTION INTRAVENOUS
Qty: 0 | Refills: 0 | Status: DISCONTINUED | OUTPATIENT
Start: 2018-04-09 | End: 2018-04-13

## 2018-04-09 RX ORDER — FUROSEMIDE 40 MG
40 TABLET ORAL ONCE
Qty: 0 | Refills: 0 | Status: COMPLETED | OUTPATIENT
Start: 2018-04-09 | End: 2018-04-09

## 2018-04-09 RX ORDER — METOPROLOL TARTRATE 50 MG
12.5 TABLET ORAL
Qty: 0 | Refills: 0 | Status: DISCONTINUED | OUTPATIENT
Start: 2018-04-09 | End: 2018-04-10

## 2018-04-09 RX ORDER — INSULIN LISPRO 100/ML
VIAL (ML) SUBCUTANEOUS AT BEDTIME
Qty: 0 | Refills: 0 | Status: DISCONTINUED | OUTPATIENT
Start: 2018-04-09 | End: 2018-04-13

## 2018-04-09 RX ORDER — GLUCAGON INJECTION, SOLUTION 0.5 MG/.1ML
1 INJECTION, SOLUTION SUBCUTANEOUS ONCE
Qty: 0 | Refills: 0 | Status: DISCONTINUED | OUTPATIENT
Start: 2018-04-09 | End: 2018-04-13

## 2018-04-09 RX ORDER — DEXTROSE 50 % IN WATER 50 %
25 SYRINGE (ML) INTRAVENOUS ONCE
Qty: 0 | Refills: 0 | Status: DISCONTINUED | OUTPATIENT
Start: 2018-04-09 | End: 2018-04-13

## 2018-04-09 RX ORDER — HYDRALAZINE HCL 50 MG
10 TABLET ORAL EVERY 8 HOURS
Qty: 0 | Refills: 0 | Status: DISCONTINUED | OUTPATIENT
Start: 2018-04-09 | End: 2018-04-09

## 2018-04-09 RX ORDER — POTASSIUM CHLORIDE 20 MEQ
20 PACKET (EA) ORAL ONCE
Qty: 0 | Refills: 0 | Status: COMPLETED | OUTPATIENT
Start: 2018-04-09 | End: 2018-04-09

## 2018-04-09 RX ADMIN — HEPARIN SODIUM 1100 UNIT(S)/HR: 5000 INJECTION INTRAVENOUS; SUBCUTANEOUS at 01:02

## 2018-04-09 RX ADMIN — HEPARIN SODIUM 1100 UNIT(S)/HR: 5000 INJECTION INTRAVENOUS; SUBCUTANEOUS at 07:58

## 2018-04-09 RX ADMIN — CLOPIDOGREL BISULFATE 75 MILLIGRAM(S): 75 TABLET, FILM COATED ORAL at 12:42

## 2018-04-09 RX ADMIN — ATORVASTATIN CALCIUM 80 MILLIGRAM(S): 80 TABLET, FILM COATED ORAL at 21:16

## 2018-04-09 RX ADMIN — Medication 40 MILLIGRAM(S): at 05:07

## 2018-04-09 RX ADMIN — Medication 1: at 12:42

## 2018-04-09 RX ADMIN — Medication 10 MILLIGRAM(S): at 05:07

## 2018-04-09 RX ADMIN — CLOPIDOGREL BISULFATE 300 MILLIGRAM(S): 75 TABLET, FILM COATED ORAL at 14:24

## 2018-04-09 RX ADMIN — Medication 81 MILLIGRAM(S): at 12:42

## 2018-04-09 RX ADMIN — Medication 400 MILLIGRAM(S): at 23:29

## 2018-04-09 RX ADMIN — Medication 12.5 MILLIGRAM(S): at 12:42

## 2018-04-09 RX ADMIN — Medication 12.5 MILLIGRAM(S): at 21:16

## 2018-04-09 RX ADMIN — Medication 20 MILLIEQUIVALENT(S): at 04:00

## 2018-04-09 RX ADMIN — Medication 1000 MILLIGRAM(S): at 23:45

## 2018-04-09 NOTE — CHART NOTE - NSCHARTNOTEFT_GEN_A_CORE
Removal of Femoral Sheath    Pulses in the left lower extremity are (palpable/audible by doppler/absent). The patient was placed in the supine position. The insertion site was identified and the sutures were removed per protocol.  The 6 Vietnamese femoral sheath was then removed. Direct pressure was applied for 18 minutes.     Monitoring of the left groin and both lower extremities including neuro-vascular checks and vital signs every 15 minutes x 4, then every 30 minutes x 2, then every 1 hour was ordered.    Complications: None    Comments: No hemorrhage, ecchymosis or hematoma. Sandbag placed, pt lying flat.

## 2018-04-09 NOTE — CHART NOTE - NSCHARTNOTEFT_GEN_A_CORE
====================  CCU MIDNIGHT ROUNDS  ====================    MARQUIS PAYNE  [id number]    ====================  SUMMARY:  ====================        ====================  NEW EVENTS:  ====================        ====================  VITALS (Last 12 hrs):  ====================    T(C): 37.1 (04-09-18 @ 00:00), Max: 37.1 (04-09-18 @ 00:00)  HR: 92 (04-09-18 @ 01:00) (88 - 100)  BP: 101/56 (04-09-18 @ 00:00) (101/56 - 132/76)  BP(mean): 68 (04-09-18 @ 00:00) (68 - 100)  ABP: --  ABP(mean): --  RR: 21 (04-09-18 @ 01:00) (14 - 22)  SpO2: 94% (04-09-18 @ 01:00) (92% - 96%)  Wt(kg): --  CVP(mm Hg): --  CO: --  CI: --  PA: --  PA(mean): --  PA(direct): --  PCWP: --  SVR: --    TELEMETRY:        *BLOOD GAS/ARTERIAL/MIXED/VENOUS  *LACTATE    I&O's Summary    08 Apr 2018 07:01  -  09 Apr 2018 01:06  --------------------------------------------------------  IN: 333 mL / OUT: 2045 mL / NET: -1712 mL        ====================  PLAN:  ====================    HEALTH ISSUES - PROBLEM Dx:        HEALTH ISSUES - R/O PROBLEM Dx:      Azalea RUIZ/CCU  #[id number] ====================  CCU MIDNIGHT ROUNDS  ====================    MARQUIS PAYNE  [id number]    ====================  SUMMARY:  ====================    85 Creole-speaking woman with history of pre-diabetes and HTN presents as transfer from [facility] for NSTEMI and emergent LHC revealing triple vessel disease with RUBEN x2 to pLAD and IABP RFA placement pending possible staged PCI.     ====================  NEW EVENTS:  ====================    no CP/palpitations/SOB. good response to IVP lasix     ====================  VITALS (Last 12 hrs):  ====================    T(C): 37.1 (04-09-18 @ 00:00), Max: 37.1 (04-09-18 @ 00:00)  HR: 92 (04-09-18 @ 01:00) (88 - 100)  BP: 101/56 (04-09-18 @ 00:00) (101/56 - 132/76)  BP(mean): 68 (04-09-18 @ 00:00) (68 - 100)  RR: 21 (04-09-18 @ 01:00) (14 - 22)  SpO2: 94% (04-09-18 @ 01:00) (92% - 96%)    TELEMETRY: sinus     I&O's Summary    08 Apr 2018 07:01  -  09 Apr 2018 01:06  --------------------------------------------------------  IN: 333 mL / OUT: 2045 mL / NET: -1712 mL      ====================  PLAN:  ====================  - DAPT, lipitor, consider hydralazine for afterload reduction as augmented diastole in 130s   - hep gtt for IABP, daily CXR     Azalea Marinelli [facility]/CCU  #[id number]

## 2018-04-09 NOTE — PROVIDER CONTACT NOTE (CRITICAL VALUE NOTIFICATION) - BACKGROUND
Admitted for NSTEMI, s/p cath with IABP placement. triple vessel disease with 2 stents placed to LAD.

## 2018-04-09 NOTE — PROGRESS NOTE ADULT - SUBJECTIVE AND OBJECTIVE BOX
CONTACT INFO:  Misbah Deshpande MD PGY 1  [phone]      HPI / INTERVAL HISTORY:  Patient complains of 4/10 left flank pain, improved from yesterday. Patient also complains of "heart burn-like" sxs after eating breakfast this AM. Denies SOB, CP, Palpitations.       OBJECTIVE:  VITAL SIGNS:  ICU Vital Signs Last 24 Hrs  T(C): 36.9 (09 Apr 2018 06:00), Max: 37.1 (09 Apr 2018 00:00)  T(F): 98.4 (09 Apr 2018 06:00), Max: 98.7 (09 Apr 2018 00:00)  HR: 98 (09 Apr 2018 09:00) (88 - 101)  BP: 101/56 (09 Apr 2018 00:00) (101/56 - 154/98)  BP(mean): 68 (09 Apr 2018 00:00) (68 - 100)  ABP: --  ABP(mean): --  RR: 25 (09 Apr 2018 09:00) (14 - 25)  SpO2: 93% (09 Apr 2018 09:00) (92% - 100%)        04-08 @ 07:01  -  04-09 @ 07:00  --------------------------------------------------------  IN: 579 mL / OUT: 2540 mL / NET: -1961 mL    04-09 @ 07:01  -  04-09 @ 09:48  --------------------------------------------------------  IN: 272 mL / OUT: 80 mL / NET: 192 mL      CAPILLARY BLOOD GLUCOSE          PHYSICAL EXAM:  Gen:   HEENT: NC/AT; PERRL, anicteric sclera  Neck: supple, no JVD  Resp: clear to ausculation B/L; no wheezes, rales or rhonchi  Cardiovasc: S1S2 normal; RRR; no murmurs, rubs or gallops  GI: soft, nondistended, nontender; +BS  Extr: warm, well-perfused, PT/DP pulses 2+ B/L; no LE edema  Skin: normal color and turgor  Neuro:     LABS:                        13.6   9.3   )-----------( 169      ( 09 Apr 2018 07:05 )             41.5     04-09    136  |  93<L>  |  19  ----------------------------<  139<H>  4.2   |  26  |  0.98    Ca    9.4      09 Apr 2018 07:05  Phos  4.1     04-08  Mg     2.2     04-08    TPro  7.9  /  Alb  3.9  /  TBili  1.0  /  DBili  x   /  AST  78<H>  /  ALT  70<H>  /  AlkPhos  67  04-09    LIVER FUNCTIONS - ( 09 Apr 2018 07:05 )  Alb: 3.9 g/dL / Pro: 7.9 g/dL / ALK PHOS: 67 U/L / ALT: 70 U/L RC / AST: 78 U/L / GGT: x           PT/INR - ( 09 Apr 2018 07:03 )   PT: 13.7 sec;   INR: 1.25 ratio         PTT - ( 09 Apr 2018 07:03 )  PTT:70.6 sec    CARDIAC MARKERS ( 09 Apr 2018 07:05 )  x     / 1.52 ng/mL / 397 U/L / x     / 10.5 ng/mL  CARDIAC MARKERS ( 08 Apr 2018 23:08 )  x     / 1.49 ng/mL / 414 U/L / x     / 17.0 ng/mL  CARDIAC MARKERS ( 08 Apr 2018 16:46 )  x     / 2.99 ng/mL / 467 U/L / x     / 25.5 ng/mL  CARDIAC MARKERS ( 08 Apr 2018 12:10 )  x     / 0.91 ng/mL / 430 U/L / x     / 19.7 ng/mL          RADIOLOGY & ADDITIONAL TESTS:       MEDICATIONS:  aspirin enteric coated 81 milliGRAM(s) Oral daily  atorvastatin 80 milliGRAM(s) Oral at bedtime  clopidogrel Tablet 75 milliGRAM(s) Oral daily  dextrose 5%. 1000 milliLiter(s) IV Continuous <Continuous>  dextrose 50% Injectable 12.5 Gram(s) IV Push once  dextrose 50% Injectable 25 Gram(s) IV Push once  dextrose 50% Injectable 25 Gram(s) IV Push once  dextrose Gel 1 Dose(s) Oral once PRN  glucagon  Injectable 1 milliGRAM(s) IntraMuscular once PRN  heparin  Infusion.  Unit(s)/Hr IV Continuous <Continuous>  heparin  Injectable 6500 Unit(s) IV Push every 6 hours PRN  heparin  Injectable 3000 Unit(s) IV Push every 6 hours PRN  hydrALAZINE 10 milliGRAM(s) Oral every 8 hours  insulin lispro (HumaLOG) corrective regimen sliding scale   SubCutaneous three times a day before meals  insulin lispro (HumaLOG) corrective regimen sliding scale   SubCutaneous at bedtime      ALLERGIES:  No Known Allergies CONTACT INFO:  Misbah Deshpande MD PGY 1  [phone]      HPI / INTERVAL HISTORY:  Patient complains of 4/10 left flank pain, improved from yesterday. Patient also complains of "heart burn-like" sxs after eating breakfast this AM. Denies SOB, CP, Palpitations.       OBJECTIVE:  VITAL SIGNS:  ICU Vital Signs Last 24 Hrs  T(C): 36.9 (09 Apr 2018 06:00), Max: 37.1 (09 Apr 2018 00:00)  T(F): 98.4 (09 Apr 2018 06:00), Max: 98.7 (09 Apr 2018 00:00)  HR: 98 (09 Apr 2018 09:00) (88 - 101)  BP: 101/56 (09 Apr 2018 00:00) (101/56 - 154/98)  BP(mean): 68 (09 Apr 2018 00:00) (68 - 100)  ABP: --  ABP(mean): --  RR: 25 (09 Apr 2018 09:00) (14 - 25)  SpO2: 93% (09 Apr 2018 09:00) (92% - 100%)        04-08 @ 07:01  -  04-09 @ 07:00  --------------------------------------------------------  IN: 579 mL / OUT: 2540 mL / NET: -1961 mL    04-09 @ 07:01  -  04-09 @ 09:48  --------------------------------------------------------  IN: 272 mL / OUT: 80 mL / NET: 192 mL      CAPILLARY BLOOD GLUCOSE          PHYSICAL EXAM:    	GENERAL: AOx3, resting comfortably, mild distress due to left leg cramp   	HEENT: pupils equal & reactive, no scleral icterus  	NECK: supple, no JVD appreciated   	CARDIAC: S1 & S2 present, regular rhythm, no murmurs/rubs/gallops  	CHEST: breath sounds clear & equal anteriorly, no wheezes, no crackles at bases   	ABDOMEN: Soft, non-distended, bowel sounds present, non-tender   	NEURO: CN II-XII grossly intact, no focal deficits  	MSK: Strength appropriate in 3/4 extremities +RLE immobilized with IABP +LLE muscle spasm  	SKIN: Warm and dry +IABP in R. groin without bleeding at site   EXT: Cool below ankle, no LE edema, 1+ DP B/L    LABS:                        13.6   9.3   )-----------( 169      ( 09 Apr 2018 07:05 )             41.5     04-09    136  |  93<L>  |  19  ----------------------------<  139<H>  4.2   |  26  |  0.98    Ca    9.4      09 Apr 2018 07:05  Phos  4.1     04-08  Mg     2.2     04-08    TPro  7.9  /  Alb  3.9  /  TBili  1.0  /  DBili  x   /  AST  78<H>  /  ALT  70<H>  /  AlkPhos  67  04-09    LIVER FUNCTIONS - ( 09 Apr 2018 07:05 )  Alb: 3.9 g/dL / Pro: 7.9 g/dL / ALK PHOS: 67 U/L / ALT: 70 U/L RC / AST: 78 U/L / GGT: x           PT/INR - ( 09 Apr 2018 07:03 )   PT: 13.7 sec;   INR: 1.25 ratio         PTT - ( 09 Apr 2018 07:03 )  PTT:70.6 sec    CARDIAC MARKERS ( 09 Apr 2018 07:05 )  x     / 1.52 ng/mL / 397 U/L / x     / 10.5 ng/mL  CARDIAC MARKERS ( 08 Apr 2018 23:08 )  x     / 1.49 ng/mL / 414 U/L / x     / 17.0 ng/mL  CARDIAC MARKERS ( 08 Apr 2018 16:46 )  x     / 2.99 ng/mL / 467 U/L / x     / 25.5 ng/mL  CARDIAC MARKERS ( 08 Apr 2018 12:10 )  x     / 0.91 ng/mL / 430 U/L / x     / 19.7 ng/mL          RADIOLOGY & ADDITIONAL TESTS:       MEDICATIONS:  aspirin enteric coated 81 milliGRAM(s) Oral daily  atorvastatin 80 milliGRAM(s) Oral at bedtime  clopidogrel Tablet 75 milliGRAM(s) Oral daily  dextrose 5%. 1000 milliLiter(s) IV Continuous <Continuous>  dextrose 50% Injectable 12.5 Gram(s) IV Push once  dextrose 50% Injectable 25 Gram(s) IV Push once  dextrose 50% Injectable 25 Gram(s) IV Push once  dextrose Gel 1 Dose(s) Oral once PRN  glucagon  Injectable 1 milliGRAM(s) IntraMuscular once PRN  heparin  Infusion.  Unit(s)/Hr IV Continuous <Continuous>  heparin  Injectable 6500 Unit(s) IV Push every 6 hours PRN  heparin  Injectable 3000 Unit(s) IV Push every 6 hours PRN  hydrALAZINE 10 milliGRAM(s) Oral every 8 hours  insulin lispro (HumaLOG) corrective regimen sliding scale   SubCutaneous three times a day before meals  insulin lispro (HumaLOG) corrective regimen sliding scale   SubCutaneous at bedtime      ALLERGIES:  No Known Allergies

## 2018-04-09 NOTE — CHART NOTE - NSCHARTNOTEFT_GEN_A_CORE
====================  CCU MIDNIGHT ROUNDS  ====================    MARQUIS PAYNE  [id number]  Patient is a 85y old  Female who presents with a chief complaint of STEMI (08 Apr 2018 21:02)      ====================  SUMMARY:  ====================  85 Creole-speaking woman with history of pre-diabetes and HTN presents as transfer from [facility] for NSTEMI and emergent LHC revealing triple vessel disease with RUBEN x2 to pLAD and IABP RFA placement and staged RCA x2 with POBA. Course c/b LBBB.    ====================  NEW EVENTS:  ====================  Staged PCI today to RCA. Started on Lopressor.    ====================  MEDICATIONS  ====================    MEDICATIONS  (STANDING):  aspirin enteric coated 81 milliGRAM(s) Oral daily  atorvastatin 80 milliGRAM(s) Oral at bedtime  clopidogrel Tablet 75 milliGRAM(s) Oral daily  dextrose 5%. 1000 milliLiter(s) (50 mL/Hr) IV Continuous <Continuous>  dextrose 50% Injectable 12.5 Gram(s) IV Push once  dextrose 50% Injectable 25 Gram(s) IV Push once  dextrose 50% Injectable 25 Gram(s) IV Push once  insulin lispro (HumaLOG) corrective regimen sliding scale   SubCutaneous three times a day before meals  insulin lispro (HumaLOG) corrective regimen sliding scale   SubCutaneous at bedtime  metoprolol tartrate 12.5 milliGRAM(s) Oral two times a day    MEDICATIONS  (PRN):  dextrose Gel 1 Dose(s) Oral once PRN Blood Glucose LESS THAN 70 milliGRAM(s)/deciliter  glucagon  Injectable 1 milliGRAM(s) IntraMuscular once PRN Glucose LESS THAN 70 milligrams/deciliter      ====================  VITALS (Last 12 hrs):  ====================    T(C): 36.4 (04-09-18 @ 19:00), Max: 36.7 (04-09-18 @ 14:00)  T(F): 97.6 (04-09-18 @ 19:00), Max: 98 (04-09-18 @ 14:00)  HR: 98 (04-09-18 @ 22:00) (88 - 100)  BP: --  BP(mean): --  ABP: --  ABP(mean): --  RR: 19 (04-09-18 @ 22:00) (19 - 31)  SpO2: 93% (04-09-18 @ 22:00) (92% - 96%)      I&O's Summary    08 Apr 2018 07:01  -  09 Apr 2018 07:00  --------------------------------------------------------  IN: 579 mL / OUT: 2540 mL / NET: -1961 mL    09 Apr 2018 07:01  -  09 Apr 2018 22:32  --------------------------------------------------------  IN: 918 mL / OUT: 545 mL / NET: 373 mL      ====================  NEW LABS:  ====================                          13.4   10.3  )-----------( 163      ( 09 Apr 2018 16:46 )             40.6     04-09    133<L>  |  92<L>  |  27<H>  ----------------------------<  135<H>  4.3   |  26  |  1.20    Ca    9.1      09 Apr 2018 16:23  Phos  4.4     04-09  Mg     2.3     04-09    TPro  7.8  /  Alb  4.0  /  TBili  0.7  /  DBili  x   /  AST  60<H>  /  ALT  66<H>  /  AlkPhos  64  04-09    PT/INR - ( 09 Apr 2018 07:03 )   PT: 13.7 sec;   INR: 1.25 ratio         PTT - ( 09 Apr 2018 07:03 )  PTT:70.6 sec  Creatine Kinase, Serum: 322 U/L <H> (04-09-18 @ 16:23)  Troponin T, Serum: 1.61 ng/mL <H> (04-09-18 @ 16:23)  Creatine Kinase, Serum: 397 U/L <H> (04-09-18 @ 07:05)  Troponin T, Serum: 1.52 ng/mL <H> (04-09-18 @ 07:05)  Troponin T, Serum: 1.49 ng/mL <H> (04-08-18 @ 23:08)  Creatine Kinase, Serum: 414 U/L <H> (04-08-18 @ 23:08)    CKMB Units: 7.1 ng/mL (04-09 @ 16:23)  CKMB Units: 10.5 ng/mL (04-09 @ 07:05)  CKMB Units: 17.0 ng/mL (04-08 @ 23:08)    ====================  PLAN:  ====================  86 yo F PMH HTN, pre-DM p/w NSTEMI s/p Staged PCI with 2xDES to pLAD, IABP and PCI to RCA.    - C/w IABP  - C/w ASA and Plavix  - F/u Hillary: still uptrending  - C/w lopressor 12.5 BID    Megan Parsons MD  PGY-1

## 2018-04-09 NOTE — PROGRESS NOTE ADULT - ASSESSMENT
85 Creole-speaking woman with history of pre-diabetes and HTN presents as transfer from [facility] for NSTEMI and emergent LHC revealing triple vessel disease with RUBEN x2 to pLAD and IABP placement pending possible staged PCI.     # Cardio  NSTEMI - TWI in II, III and avF with Q waves possibly late-presentation; LHC revealed triple-vessel disease s/p RUBEN x2 to pLAD with possible staged PCI planned  - IABP placed in R. groin in setting of triple vessel disease with good augmentation of blood pressures; cont heparin drip    - s/p ASA and Plavix load at OSH; cont ASA 81, Plavix 75 as patient on heparin drip for IABP  - cardiac enzymes uptrending (trop 0.91 --> 2.99); cont to trend until peak  - needs ACEi, beta blocker  Hypertension - normotensive with augmented blood pressures  - clarify outpatient antihypertensives with family in AM    # Pulm  Dyspnea - likely secondary to volume overload in setting of NSTEMI with elevated LVEDP 30, given Lasix 40 IV with resolution  - infectious etiology less likely given absence of cough, infiltrates on CXR    # MSK  Left leg spasms - etiology unclear, resolved spontaneously; cont monitor, replete electrolytes PRN    # Endo  Pre-diabetes - not on oral medications at home, HbA1c 6.2; monitor FSG with ISS    #DVT ppx: anticoagulated with heparin drip     P Hudson SANTANA-PGY2  CCU/Internal Medicine 85 Creole-speaking woman with history of pre-diabetes and HTN presents as transfer from [facility] for NSTEMI and emergent LHC revealing triple vessel disease with RUBEN x2 to pLAD and IABP placement pending possible staged PCI.     # Cardio  NSTEMI - TWI in II, III and avF with Q waves possibly late-presentation; LHC revealed triple-vessel disease s/p RUBEN x2 to pLAD with possible staged PCI planned  - IABP placed in R. groin in setting of triple vessel disease with good augmentation of blood pressures; cont heparin drip    - s/p ASA and Plavix load at OSH; cont ASA 81, Plavix 75 as patient on heparin drip for IABP  -staged PCI today  - cardiac enzymes downtrending (trop 0.91 --> 2.99--->1.49)  -c/w hydralazine 10mg q8hrs for afterload reduction.   - hold ACEi, beta blocker for now.  Hypertension - normotensive with augmented blood pressures      # Pulm  Dyspnea - likely secondary to volume overload in setting of NSTEMI with elevated LVEDP 30, given Lasix 40 IV on 4/8 with resolution  - infectious etiology less likely given absence of cough, infiltrates on CXR    # MSK  Left leg spasms - etiology unclear, resolved spontaneously; cont monitor, replete electrolytes PRN    # Endo  Pre-diabetes - not on oral medications at home, HbA1c 6.2; monitor FSG with ISS    #DVT ppx: anticoagulated with heparin drip     Misbah Deshpande MD PGY 1  CCU/Internal Medicine 85 Creole-speaking woman with history of pre-diabetes and HTN presents as transfer from [facility] for NSTEMI and emergent LHC revealing triple vessel disease with RUBEN x2 to pLAD and IABP placement pending possible staged PCI.     # Cardio  NSTEMI - TWI in II, III and avF with Q waves possibly late-presentation; LHC revealed triple-vessel disease s/p RUBEN x2 to pLAD with possible staged PCI planned  - IABP placed in R. groin in setting of triple vessel disease with good augmentation of blood pressures; cont heparin drip    - s/p ASA and Plavix load at OSH; cont ASA 81, Plavix 75 as patient on heparin drip for IABP  -staged PCI today  - cardiac enzymes downtrending (trop 0.91 --> 2.99--->1.49)  -d/c hydralazine   - start BB 12.5 BID  Hypertension - normotensive with augmented blood pressures      # Pulm  Dyspnea - likely secondary to volume overload in setting of NSTEMI with elevated LVEDP 30, given Lasix 40 IV on 4/8 with resolution  - infectious etiology less likely given absence of cough, infiltrates on CXR    # MSK  Left leg spasms - etiology unclear, resolved spontaneously; cont monitor, replete electrolytes PRN    # Endo  Pre-diabetes - not on oral medications at home, HbA1c 6.2; monitor FSG with ISS    #DVT ppx: anticoagulated with heparin drip     Misbah Deshpande MD PGY 1  CCU/Internal Medicine

## 2018-04-09 NOTE — PROGRESS NOTE ADULT - ATTENDING COMMENTS
Patient seen and examined. Agree with assessment and plan as outlined above. Patient with NSTEMI with 3vessel CAD s/p PCI with RUBEN to LAD. s/p IABP. Cardiac enzymes downtrending  For staged PCI today. EF 35% on LHC. D/c hydralazine. Patient on metoprolol 12.5 BID. Continue CCU care.

## 2018-04-09 NOTE — PROVIDER CONTACT NOTE (CRITICAL VALUE NOTIFICATION) - ACTION/TREATMENT ORDERED:
Ordered to follow nomogram as per procotol. Will continue to monitor pt. How Severe Are Your Spot(S)?: mild Hpi Title: Evaluation of Skin Lesions

## 2018-04-10 ENCOUNTER — TRANSCRIPTION ENCOUNTER (OUTPATIENT)
Age: 83
End: 2018-04-10

## 2018-04-10 LAB
ALBUMIN SERPL ELPH-MCNC: 3.6 G/DL — SIGNIFICANT CHANGE UP (ref 3.3–5)
ALBUMIN SERPL ELPH-MCNC: 3.7 G/DL — SIGNIFICANT CHANGE UP (ref 3.3–5)
ALP SERPL-CCNC: 81 U/L — SIGNIFICANT CHANGE UP (ref 40–120)
ALP SERPL-CCNC: 91 U/L — SIGNIFICANT CHANGE UP (ref 40–120)
ALT FLD-CCNC: 53 U/L RC — HIGH (ref 10–45)
ALT FLD-CCNC: 68 U/L RC — HIGH (ref 10–45)
ANION GAP SERPL CALC-SCNC: 14 MMOL/L — SIGNIFICANT CHANGE UP (ref 5–17)
ANION GAP SERPL CALC-SCNC: 15 MMOL/L — SIGNIFICANT CHANGE UP (ref 5–17)
AST SERPL-CCNC: 35 U/L — SIGNIFICANT CHANGE UP (ref 10–40)
AST SERPL-CCNC: 63 U/L — HIGH (ref 10–40)
BASOPHILS # BLD AUTO: 0 K/UL — SIGNIFICANT CHANGE UP (ref 0–0.2)
BASOPHILS NFR BLD AUTO: 0.2 % — SIGNIFICANT CHANGE UP (ref 0–2)
BILIRUB SERPL-MCNC: 0.5 MG/DL — SIGNIFICANT CHANGE UP (ref 0.2–1.2)
BILIRUB SERPL-MCNC: 1.1 MG/DL — SIGNIFICANT CHANGE UP (ref 0.2–1.2)
BUN SERPL-MCNC: 25 MG/DL — HIGH (ref 7–23)
BUN SERPL-MCNC: 35 MG/DL — HIGH (ref 7–23)
CALCIUM SERPL-MCNC: 9 MG/DL — SIGNIFICANT CHANGE UP (ref 8.4–10.5)
CALCIUM SERPL-MCNC: 9.4 MG/DL — SIGNIFICANT CHANGE UP (ref 8.4–10.5)
CHLORIDE SERPL-SCNC: 93 MMOL/L — LOW (ref 96–108)
CHLORIDE SERPL-SCNC: 94 MMOL/L — LOW (ref 96–108)
CK MB BLD-MCNC: 2.2 % — SIGNIFICANT CHANGE UP (ref 0–3.5)
CK MB CFR SERPL CALC: 5.3 NG/ML — HIGH (ref 0–3.8)
CK SERPL-CCNC: 245 U/L — HIGH (ref 25–170)
CO2 SERPL-SCNC: 24 MMOL/L — SIGNIFICANT CHANGE UP (ref 22–31)
CO2 SERPL-SCNC: 27 MMOL/L — SIGNIFICANT CHANGE UP (ref 22–31)
CREAT SERPL-MCNC: 1.04 MG/DL — SIGNIFICANT CHANGE UP (ref 0.5–1.3)
CREAT SERPL-MCNC: 1.22 MG/DL — SIGNIFICANT CHANGE UP (ref 0.5–1.3)
EOSINOPHIL # BLD AUTO: 0.1 K/UL — SIGNIFICANT CHANGE UP (ref 0–0.5)
EOSINOPHIL NFR BLD AUTO: 1.1 % — SIGNIFICANT CHANGE UP (ref 0–6)
GLUCOSE BLDC GLUCOMTR-MCNC: 132 MG/DL — HIGH (ref 70–99)
GLUCOSE BLDC GLUCOMTR-MCNC: 140 MG/DL — HIGH (ref 70–99)
GLUCOSE BLDC GLUCOMTR-MCNC: 163 MG/DL — HIGH (ref 70–99)
GLUCOSE SERPL-MCNC: 139 MG/DL — HIGH (ref 70–99)
GLUCOSE SERPL-MCNC: 157 MG/DL — HIGH (ref 70–99)
HCT VFR BLD CALC: 39.9 % — SIGNIFICANT CHANGE UP (ref 34.5–45)
HGB BLD-MCNC: 13.4 G/DL — SIGNIFICANT CHANGE UP (ref 11.5–15.5)
LYMPHOCYTES # BLD AUTO: 2.6 K/UL — SIGNIFICANT CHANGE UP (ref 1–3.3)
LYMPHOCYTES # BLD AUTO: 28 % — SIGNIFICANT CHANGE UP (ref 13–44)
MAGNESIUM SERPL-MCNC: 2.2 MG/DL — SIGNIFICANT CHANGE UP (ref 1.6–2.6)
MAGNESIUM SERPL-MCNC: 2.6 MG/DL — SIGNIFICANT CHANGE UP (ref 1.6–2.6)
MCHC RBC-ENTMCNC: 30.2 PG — SIGNIFICANT CHANGE UP (ref 27–34)
MCHC RBC-ENTMCNC: 33.5 GM/DL — SIGNIFICANT CHANGE UP (ref 32–36)
MCV RBC AUTO: 90.3 FL — SIGNIFICANT CHANGE UP (ref 80–100)
MONOCYTES # BLD AUTO: 1.2 K/UL — HIGH (ref 0–0.9)
MONOCYTES NFR BLD AUTO: 12.8 % — SIGNIFICANT CHANGE UP (ref 2–14)
NEUTROPHILS # BLD AUTO: 5.4 K/UL — SIGNIFICANT CHANGE UP (ref 1.8–7.4)
NEUTROPHILS NFR BLD AUTO: 57.9 % — SIGNIFICANT CHANGE UP (ref 43–77)
PHOSPHATE SERPL-MCNC: 4.1 MG/DL — SIGNIFICANT CHANGE UP (ref 2.5–4.5)
PHOSPHATE SERPL-MCNC: 4.7 MG/DL — HIGH (ref 2.5–4.5)
PLATELET # BLD AUTO: 147 K/UL — LOW (ref 150–400)
POTASSIUM SERPL-MCNC: 3.9 MMOL/L — SIGNIFICANT CHANGE UP (ref 3.5–5.3)
POTASSIUM SERPL-MCNC: 4.7 MMOL/L — SIGNIFICANT CHANGE UP (ref 3.5–5.3)
POTASSIUM SERPL-SCNC: 3.9 MMOL/L — SIGNIFICANT CHANGE UP (ref 3.5–5.3)
POTASSIUM SERPL-SCNC: 4.7 MMOL/L — SIGNIFICANT CHANGE UP (ref 3.5–5.3)
PROT SERPL-MCNC: 7.7 G/DL — SIGNIFICANT CHANGE UP (ref 6–8.3)
PROT SERPL-MCNC: 7.8 G/DL — SIGNIFICANT CHANGE UP (ref 6–8.3)
RBC # BLD: 4.41 M/UL — SIGNIFICANT CHANGE UP (ref 3.8–5.2)
RBC # FLD: 13.1 % — SIGNIFICANT CHANGE UP (ref 10.3–14.5)
SODIUM SERPL-SCNC: 131 MMOL/L — LOW (ref 135–145)
SODIUM SERPL-SCNC: 136 MMOL/L — SIGNIFICANT CHANGE UP (ref 135–145)
TROPONIN T SERPL-MCNC: 1.29 NG/ML — HIGH (ref 0–0.06)
WBC # BLD: 9.4 K/UL — SIGNIFICANT CHANGE UP (ref 3.8–10.5)
WBC # FLD AUTO: 9.4 K/UL — SIGNIFICANT CHANGE UP (ref 3.8–10.5)

## 2018-04-10 PROCEDURE — 93010 ELECTROCARDIOGRAM REPORT: CPT

## 2018-04-10 PROCEDURE — 71045 X-RAY EXAM CHEST 1 VIEW: CPT | Mod: 26

## 2018-04-10 PROCEDURE — 93321 DOPPLER ECHO F-UP/LMTD STD: CPT | Mod: 26

## 2018-04-10 PROCEDURE — 99291 CRITICAL CARE FIRST HOUR: CPT

## 2018-04-10 PROCEDURE — 93308 TTE F-UP OR LMTD: CPT | Mod: 26

## 2018-04-10 RX ORDER — METOPROLOL TARTRATE 50 MG
12.5 TABLET ORAL ONCE
Qty: 0 | Refills: 0 | Status: COMPLETED | OUTPATIENT
Start: 2018-04-10 | End: 2018-04-10

## 2018-04-10 RX ORDER — METOPROLOL TARTRATE 50 MG
50 TABLET ORAL DAILY
Qty: 0 | Refills: 0 | Status: DISCONTINUED | OUTPATIENT
Start: 2018-04-10 | End: 2018-04-12

## 2018-04-10 RX ORDER — CAPTOPRIL 12.5 MG/1
6.25 TABLET ORAL EVERY 8 HOURS
Qty: 0 | Refills: 0 | Status: DISCONTINUED | OUTPATIENT
Start: 2018-04-10 | End: 2018-04-12

## 2018-04-10 RX ORDER — HEPARIN SODIUM 5000 [USP'U]/ML
5000 INJECTION INTRAVENOUS; SUBCUTANEOUS EVERY 8 HOURS
Qty: 0 | Refills: 0 | Status: DISCONTINUED | OUTPATIENT
Start: 2018-04-11 | End: 2018-04-13

## 2018-04-10 RX ORDER — FENTANYL CITRATE 50 UG/ML
25 INJECTION INTRAVENOUS ONCE
Qty: 0 | Refills: 0 | Status: DISCONTINUED | OUTPATIENT
Start: 2018-04-10 | End: 2018-04-10

## 2018-04-10 RX ORDER — POTASSIUM CHLORIDE 20 MEQ
20 PACKET (EA) ORAL ONCE
Qty: 0 | Refills: 0 | Status: COMPLETED | OUTPATIENT
Start: 2018-04-10 | End: 2018-04-10

## 2018-04-10 RX ADMIN — CAPTOPRIL 6.25 MILLIGRAM(S): 12.5 TABLET ORAL at 05:44

## 2018-04-10 RX ADMIN — Medication 1: at 17:12

## 2018-04-10 RX ADMIN — Medication 12.5 MILLIGRAM(S): at 17:03

## 2018-04-10 RX ADMIN — ATORVASTATIN CALCIUM 80 MILLIGRAM(S): 80 TABLET, FILM COATED ORAL at 21:51

## 2018-04-10 RX ADMIN — FENTANYL CITRATE 25 MICROGRAM(S): 50 INJECTION INTRAVENOUS at 14:22

## 2018-04-10 RX ADMIN — CLOPIDOGREL BISULFATE 75 MILLIGRAM(S): 75 TABLET, FILM COATED ORAL at 17:03

## 2018-04-10 RX ADMIN — Medication 12.5 MILLIGRAM(S): at 05:44

## 2018-04-10 RX ADMIN — FENTANYL CITRATE 25 MICROGRAM(S): 50 INJECTION INTRAVENOUS at 14:15

## 2018-04-10 RX ADMIN — Medication 12.5 MILLIGRAM(S): at 21:51

## 2018-04-10 RX ADMIN — Medication 81 MILLIGRAM(S): at 17:03

## 2018-04-10 RX ADMIN — Medication 20 MILLIEQUIVALENT(S): at 05:44

## 2018-04-10 RX ADMIN — CAPTOPRIL 6.25 MILLIGRAM(S): 12.5 TABLET ORAL at 21:51

## 2018-04-10 NOTE — PROGRESS NOTE ADULT - ATTENDING COMMENTS
Patient seen and examined. Agree with assessment and plan as outlined above. 86 yo F with anterior wall MI s/p PCI with RUBEN to LAD s/p staged PCI to RCA. Cardiac enzymes are downtrending. Nonaugmented BP~100-110 mmHg. Plan to discontinue IABP. Continue CCU care.

## 2018-04-10 NOTE — PROGRESS NOTE ADULT - SUBJECTIVE AND OBJECTIVE BOX
HPI / INTERVAL HISTORY:  o/n recurrent left flank pain (same as yesterday). IV tylenol was given. Captopril was added for afterload reduction. heparin held for possible IABP removal today. Denies CP, SOB, palpitations.       OBJECTIVE:  VITAL SIGNS:  ICU Vital Signs Last 24 Hrs  T(C): 36.7 (10 Apr 2018 05:00), Max: 36.7 (09 Apr 2018 14:00)  T(F): 98.1 (10 Apr 2018 05:00), Max: 98.1 (10 Apr 2018 05:00)  HR: 86 (10 Apr 2018 06:00) (84 - 100)  BP: --  BP(mean): --  ABP: --  ABP(mean): --  RR: 16 (10 Apr 2018 06:00) (13 - 32)  SpO2: 94% (10 Apr 2018 06:00) (92% - 96%)        04-09 @ 07:01  -  04-10 @ 07:00  --------------------------------------------------------  IN: 1258 mL / OUT: 935 mL / NET: 323 mL      CAPILLARY BLOOD GLUCOSE      POCT Blood Glucose.: 139 mg/dL (09 Apr 2018 21:19)      PHYSICAL EXAM:   GENERAL: AOx3, resting comfortably, mild distress due to left leg cramp   HEENT: pupils equal & reactive, no scleral icterus  NECK: supple, no JVD appreciated   CARDIAC: S1 & S2 present, regular rhythm, no murmurs/rubs/gallops  CHEST: breath sounds clear & equal anteriorly, no wheezes, no crackles at bases   ABDOMEN: Soft, non-distended, bowel sounds present, non-tender   NEURO: CN II-XII grossly intact, no focal deficits  MSK: Strength appropriate in 3/4 extremities +RLE immobilized with IABP +LLE muscle spasm  SKIN: Warm and dry +IABP in R. groin without bleeding at site   EXT: Cool below ankle, no LE edema, 1+ DP B/L    LABS:                        13.4   9.4   )-----------( 147      ( 10 Apr 2018 04:48 )             39.9     04-10    136  |  94<L>  |  25<H>  ----------------------------<  139<H>  3.9   |  27  |  1.04    Ca    9.4      10 Apr 2018 04:48  Phos  4.7     04-10  Mg     2.2     04-10    TPro  7.7  /  Alb  3.7  /  TBili  1.1  /  DBili  x   /  AST  63<H>  /  ALT  68<H>  /  AlkPhos  91  04-10    LIVER FUNCTIONS - ( 10 Apr 2018 04:48 )  Alb: 3.7 g/dL / Pro: 7.7 g/dL / ALK PHOS: 91 U/L / ALT: 68 U/L RC / AST: 63 U/L / GGT: x           PT/INR - ( 09 Apr 2018 07:03 )   PT: 13.7 sec;   INR: 1.25 ratio         PTT - ( 09 Apr 2018 07:03 )  PTT:70.6 sec    CARDIAC MARKERS ( 10 Apr 2018 04:48 )  x     / 1.29 ng/mL / 245 U/L / x     / 5.3 ng/mL  CARDIAC MARKERS ( 09 Apr 2018 16:23 )  x     / 1.61 ng/mL / 322 U/L / x     / 7.1 ng/mL  CARDIAC MARKERS ( 09 Apr 2018 07:05 )  x     / 1.52 ng/mL / 397 U/L / x     / 10.5 ng/mL  CARDIAC MARKERS ( 08 Apr 2018 23:08 )  x     / 1.49 ng/mL / 414 U/L / x     / 17.0 ng/mL  CARDIAC MARKERS ( 08 Apr 2018 16:46 )  x     / 2.99 ng/mL / 467 U/L / x     / 25.5 ng/mL  CARDIAC MARKERS ( 08 Apr 2018 12:10 )  x     / 0.91 ng/mL / 430 U/L / x     / 19.7 ng/mL          RADIOLOGY & ADDITIONAL TESTS:       MEDICATIONS:  aspirin enteric coated 81 milliGRAM(s) Oral daily  atorvastatin 80 milliGRAM(s) Oral at bedtime  captopril 6.25 milliGRAM(s) Oral every 8 hours  clopidogrel Tablet 75 milliGRAM(s) Oral daily  dextrose 5%. 1000 milliLiter(s) IV Continuous <Continuous>  dextrose 50% Injectable 12.5 Gram(s) IV Push once  dextrose 50% Injectable 25 Gram(s) IV Push once  dextrose 50% Injectable 25 Gram(s) IV Push once  dextrose Gel 1 Dose(s) Oral once PRN  glucagon  Injectable 1 milliGRAM(s) IntraMuscular once PRN  insulin lispro (HumaLOG) corrective regimen sliding scale   SubCutaneous three times a day before meals  insulin lispro (HumaLOG) corrective regimen sliding scale   SubCutaneous at bedtime  metoprolol tartrate 12.5 milliGRAM(s) Oral two times a day      ALLERGIES:  No Known Allergies

## 2018-04-10 NOTE — CHART NOTE - NSCHARTNOTEFT_GEN_A_CORE
Removal of Intra-Aortic Balloon Pump    The IABP (Intra-Aortic Balloon Pump) was weaned according to protocol.  Hemodynamics remained stable.  Pulses in the right lower extremity are (palpable/audible by doppler. The patient was placed in the supine position. The insertion site was identified and the sutures were removed per protocol.  The IABP was turned off, the balloon deflated and then removed.  Direct pressure was applied for 30 minutes.     Monitoring of the right groin and both lower extremities including neuro-vascular checks and vital signs every 15 minutes x 4, then every 30 minutes x 2, then every 1 hour was ordered.    Complications: None    Comments: Reinforced using family member and  service in Creole multiple times for the patient to remain still and to not move her right lower extremity until instructed further.

## 2018-04-10 NOTE — CHART NOTE - NSCHARTNOTEFT_GEN_A_CORE
====================  CCU MIDNIGHT ROUNDS  ====================    MARQUIS PAYNE  [id number]    ====================  SUMMARY: HPI:  85 Creole-speaking woman with history of pre-diabetes and HTN presents as transfer from [facility] for NSTEMI and emergent LHC. Patient initially presented to OSH for shortness of breath accompanied by epigastric abdominal pain for one day duration. Patient lives with son who provided translation at bedside. He noted patient gasping for breath yesterday afternoon complaining of dyspepsia and  epigastric abdominal pain temporarily relieved with Maalox. Patient had similar symptoms approx. 2 weeks ago that also resolved with OTC management. At 4 am today, son noted patient to have difficulty with breathing, appearing in more distress than earlier that day. She had not experienced subjective fevers, chills, nausea, vomiting or changes in appetite. She had intermittent chest pain approx. 1 month ago and went to PMD where EKG reportedly did not show anything of concern. She is a non-smoker with no prior cardiac history. Family history non-contributory.      At [place], patient endorsed left-sided chest pain along with shortness of breath, found with Q waves in leads III, avF; TWI in lead II,III, avF. V5-V6. Patient given ASA/Plavix load and nitroglycerin patch prior to transfer to [facility]. Patient  underwent PCI with RUBEN x2 to [facility] via R. femoral access with placement of intra-aortic balloon pump. LHC also revealed EF 30% and LVEDP 35 for which she was given Lasix 40 IV x1. Cardiac enzymes markedly elevated upon transfer to CCU --> 467; trop 0.91 --> 2.99, CKMB 19.1 --> 25.5.  Left-sided chest pain, epigastric discomfort and shortness of breath now resolved. Patient only complaint is left lower extremity muscle spasm with pain. (08 Apr 2018 21:02)    ====================        ====================  NEW EVENTS: 20 beats of NSVT. Will give extra 12.5 lopressor and start toprol xl In AM. Check lytes.   ====================        ====================  VITALS (Last 12 hrs):  ====================    T(C): 36.7 (04-10-18 @ 21:16), Max: 36.7 (04-10-18 @ 19:00)  HR: 104 (04-10-18 @ 22:00) (88 - 106)  BP: 122/72 (04-10-18 @ 22:00) (78/54 - 140/73)  BP(mean): 86 (04-10-18 @ 22:00) (60 - 98)  RR: 22 (04-10-18 @ 22:00) (19 - 33)  SpO2: 99% (04-10-18 @ 22:00) (90% - 99%)      TELEMETRY: sinus tach 90s-100s.          I&O's Summary    09 Apr 2018 07:01  -  10 Apr 2018 07:00  --------------------------------------------------------  IN: 1258 mL / OUT: 935 mL / NET: 323 mL    10 Apr 2018 07:01  -  10 Apr 2018 22:21  --------------------------------------------------------  IN: 740 mL / OUT: 565 mL / NET: 175 mL        ====================  PLAN:  #cardiac: s/p PCI to LAD and RCA assisted with IABP (removed today). Continue monitoring Right groin, currently soft without hematoma. C/w DAPT, statin. Can change to lisinopril in AM and toprol XL in AM. Will give extra 12.5 lopressor now for ventricular ectopy, also check lytes. CE downtrending. TTE without LV thrombus.   ====================    HEALTH ISSUES - PROBLEM Dx:        Shanna Woodard, CCU PA #[id number]

## 2018-04-10 NOTE — PROGRESS NOTE ADULT - ASSESSMENT
85 Creole-speaking woman with history of pre-diabetes and HTN presents as transfer from [facility] for NSTEMI and emergent LHC revealing triple vessel disease with RUBEN x2 to pLAD and IABP placement pending possible staged PCI.     # Cardio  NSTEMI - TWI in II, III and avF with Q waves possibly late-presentation; LHC revealed triple-vessel disease s/p RUBEN x2 to pLAD with possible staged PCI planned  - IABP placed in R. groin in setting of triple vessel disease with good augmentation of blood pressures; cont heparin drip    - s/p ASA and Plavix load at OSH; cont ASA 81, Plavix 75 as patient on heparin drip for IABP  -staged PCI today  - cardiac enzymes downtrending (trop 0.91 --> 2.99--->1.49)  -d/c hydralazine   -c/w captopril  - c/w 12.5 BID  Hypertension - normotensive with augmented blood pressures      # Pulm  Dyspnea - likely secondary to volume overload in setting of NSTEMI with elevated LVEDP 30, given Lasix 40 IV on 4/8 with resolution  - infectious etiology less likely given absence of cough, infiltrates on CXR    # MSK  Left leg spasms - etiology unclear, resolved spontaneously; cont monitor, replete electrolytes PRN    # Endo  Pre-diabetes - not on oral medications at home, HbA1c 6.2; monitor FSG with ISS    #DVT ppx: heparin held for possible IABP removal today.     Misbah Deshpande MD PGY 1  CCU/Internal Medicine 85 Creole-speaking woman with history of pre-diabetes and HTN presents as transfer from [facility] for NSTEMI and emergent LHC revealing triple vessel disease with RUBEN x2 to pLAD and IABP placement pending possible staged PCI.     # Cardio  NSTEMI - TWI in II, III and avF with Q waves possibly late-presentation; LHC revealed triple-vessel disease s/p RUBEN x2 to pLAD. staged RCAx2 with POBA c/b LBBB  - IABP placed in R. groin in setting of triple vessel disease with good augmentation of blood pressures; cont heparin drip. plan to remove today.  - s/p ASA and Plavix load at OSH; cont ASA 81, Plavix 75 as patient on heparin drip   - cardiac enzymes downtrending (trop 0.91 --> 2.99--->1.49)  -d/c hydralazine   -c/w captopril  Hypertension - normotensive with augmented blood pressures      # Pulm  Dyspnea - likely secondary to volume overload in setting of NSTEMI with elevated LVEDP 30, given Lasix 40 IV on 4/8 with resolution  - infectious etiology less likely given absence of cough, infiltrates on CXR    # MSK  Left leg spasms - etiology unclear, resolved spontaneously; cont monitor, replete electrolytes PRN    # Endo  Pre-diabetes - not on oral medications at home, HbA1c 6.2; monitor FSG with ISS    #DVT ppx: heparin held for possible IABP removal today.     Misbah Deshpande MD PGY 1  CCU/Internal Medicine 85 Creole-speaking woman with history of pre-diabetes and HTN presents as transfer from [facility] for NSTEMI and emergent LHC revealing triple vessel disease with RUBEN x2 to pLAD and IABP. s/p staged RCA x2 w/ POBA c/b LBBB    # Cardio  NSTEMI - TWI in II, III and avF with Q waves possibly late-presentation; LHC revealed triple-vessel disease s/p RUBEN x2 to pLAD. staged RCAx2 with POBA c/b LBBB  - IABP placed in R. groin in setting of triple vessel disease with good augmentation of blood pressures; cont heparin drip. plan to remove today.  - s/p ASA and Plavix load at OSH; cont ASA 81, Plavix 75 as patient on heparin drip   - cardiac enzymes downtrending (trop 0.91 --> 2.99--->1.49)  -d/c hydralazine   -c/w captopril  Hypertension - normotensive with augmented blood pressures      # Pulm  Dyspnea - likely secondary to volume overload in setting of NSTEMI with elevated LVEDP 30, given Lasix 40 IV on 4/8 with resolution  - infectious etiology less likely given absence of cough, infiltrates on CXR    # MSK  Left leg spasms - etiology unclear, resolved spontaneously; cont monitor, replete electrolytes PRN    # Endo  Pre-diabetes - not on oral medications at home, HbA1c 6.2; monitor FSG with ISS    #DVT ppx: heparin held for possible IABP removal today.     Misbah Deshpande MD PGY 1  CCU/Internal Medicine

## 2018-04-11 DIAGNOSIS — I21.4 NON-ST ELEVATION (NSTEMI) MYOCARDIAL INFARCTION: ICD-10-CM

## 2018-04-11 DIAGNOSIS — E11.9 TYPE 2 DIABETES MELLITUS WITHOUT COMPLICATIONS: ICD-10-CM

## 2018-04-11 DIAGNOSIS — I50.21 ACUTE SYSTOLIC (CONGESTIVE) HEART FAILURE: ICD-10-CM

## 2018-04-11 DIAGNOSIS — R10.10 UPPER ABDOMINAL PAIN, UNSPECIFIED: ICD-10-CM

## 2018-04-11 DIAGNOSIS — I10 ESSENTIAL (PRIMARY) HYPERTENSION: ICD-10-CM

## 2018-04-11 DIAGNOSIS — Z29.9 ENCOUNTER FOR PROPHYLACTIC MEASURES, UNSPECIFIED: ICD-10-CM

## 2018-04-11 LAB
ALBUMIN SERPL ELPH-MCNC: 3.5 G/DL — SIGNIFICANT CHANGE UP (ref 3.3–5)
ALP SERPL-CCNC: 76 U/L — SIGNIFICANT CHANGE UP (ref 40–120)
ALT FLD-CCNC: 49 U/L RC — HIGH (ref 10–45)
ANION GAP SERPL CALC-SCNC: 14 MMOL/L — SIGNIFICANT CHANGE UP (ref 5–17)
APTT BLD: 28.5 SEC — SIGNIFICANT CHANGE UP (ref 27.5–37.4)
AST SERPL-CCNC: 32 U/L — SIGNIFICANT CHANGE UP (ref 10–40)
BASOPHILS # BLD AUTO: 0.1 K/UL — SIGNIFICANT CHANGE UP (ref 0–0.2)
BILIRUB SERPL-MCNC: 0.7 MG/DL — SIGNIFICANT CHANGE UP (ref 0.2–1.2)
BUN SERPL-MCNC: 39 MG/DL — HIGH (ref 7–23)
CALCIUM SERPL-MCNC: 9.3 MG/DL — SIGNIFICANT CHANGE UP (ref 8.4–10.5)
CHLORIDE SERPL-SCNC: 96 MMOL/L — SIGNIFICANT CHANGE UP (ref 96–108)
CO2 SERPL-SCNC: 25 MMOL/L — SIGNIFICANT CHANGE UP (ref 22–31)
CREAT SERPL-MCNC: 1.28 MG/DL — SIGNIFICANT CHANGE UP (ref 0.5–1.3)
EOSINOPHIL # BLD AUTO: 0.5 K/UL — SIGNIFICANT CHANGE UP (ref 0–0.5)
EOSINOPHIL NFR BLD AUTO: 2 % — SIGNIFICANT CHANGE UP (ref 0–6)
GLUCOSE BLDC GLUCOMTR-MCNC: 110 MG/DL — HIGH (ref 70–99)
GLUCOSE BLDC GLUCOMTR-MCNC: 112 MG/DL — HIGH (ref 70–99)
GLUCOSE BLDC GLUCOMTR-MCNC: 120 MG/DL — HIGH (ref 70–99)
GLUCOSE BLDC GLUCOMTR-MCNC: 139 MG/DL — HIGH (ref 70–99)
GLUCOSE SERPL-MCNC: 123 MG/DL — HIGH (ref 70–99)
HCT VFR BLD CALC: 41.2 % — SIGNIFICANT CHANGE UP (ref 34.5–45)
HGB BLD-MCNC: 13.7 G/DL — SIGNIFICANT CHANGE UP (ref 11.5–15.5)
INR BLD: 1.19 RATIO — HIGH (ref 0.88–1.16)
LYMPHOCYTES # BLD AUTO: 35 % — SIGNIFICANT CHANGE UP (ref 13–44)
LYMPHOCYTES # BLD AUTO: 4.8 K/UL — HIGH (ref 1–3.3)
MAGNESIUM SERPL-MCNC: 2.5 MG/DL — SIGNIFICANT CHANGE UP (ref 1.6–2.6)
MCHC RBC-ENTMCNC: 30.6 PG — SIGNIFICANT CHANGE UP (ref 27–34)
MCHC RBC-ENTMCNC: 33.1 GM/DL — SIGNIFICANT CHANGE UP (ref 32–36)
MCV RBC AUTO: 92.4 FL — SIGNIFICANT CHANGE UP (ref 80–100)
MONOCYTES # BLD AUTO: 1.8 K/UL — HIGH (ref 0–0.9)
MONOCYTES NFR BLD AUTO: 13 % — SIGNIFICANT CHANGE UP (ref 2–14)
NEUTROPHILS # BLD AUTO: 6.1 K/UL — SIGNIFICANT CHANGE UP (ref 1.8–7.4)
NEUTROPHILS NFR BLD AUTO: 50 % — SIGNIFICANT CHANGE UP (ref 43–77)
PHOSPHATE SERPL-MCNC: 5.5 MG/DL — HIGH (ref 2.5–4.5)
PLATELET # BLD AUTO: 147 K/UL — LOW (ref 150–400)
POTASSIUM SERPL-MCNC: 4.6 MMOL/L — SIGNIFICANT CHANGE UP (ref 3.5–5.3)
POTASSIUM SERPL-SCNC: 4.6 MMOL/L — SIGNIFICANT CHANGE UP (ref 3.5–5.3)
PROT SERPL-MCNC: 7.7 G/DL — SIGNIFICANT CHANGE UP (ref 6–8.3)
PROTHROM AB SERPL-ACNC: 13 SEC — HIGH (ref 9.8–12.7)
RBC # BLD: 4.46 M/UL — SIGNIFICANT CHANGE UP (ref 3.8–5.2)
RBC # FLD: 13.3 % — SIGNIFICANT CHANGE UP (ref 10.3–14.5)
SODIUM SERPL-SCNC: 135 MMOL/L — SIGNIFICANT CHANGE UP (ref 135–145)
WBC # BLD: 13.2 K/UL — HIGH (ref 3.8–10.5)
WBC # FLD AUTO: 13.2 K/UL — HIGH (ref 3.8–10.5)

## 2018-04-11 PROCEDURE — 99233 SBSQ HOSP IP/OBS HIGH 50: CPT

## 2018-04-11 PROCEDURE — 93010 ELECTROCARDIOGRAM REPORT: CPT

## 2018-04-11 RX ORDER — FUROSEMIDE 40 MG
40 TABLET ORAL DAILY
Qty: 0 | Refills: 0 | Status: DISCONTINUED | OUTPATIENT
Start: 2018-04-12 | End: 2018-04-12

## 2018-04-11 RX ORDER — DOCUSATE SODIUM 100 MG
100 CAPSULE ORAL
Qty: 0 | Refills: 0 | Status: DISCONTINUED | OUTPATIENT
Start: 2018-04-11 | End: 2018-04-13

## 2018-04-11 RX ORDER — SIMETHICONE 80 MG/1
80 TABLET, CHEWABLE ORAL
Qty: 0 | Refills: 0 | Status: DISCONTINUED | OUTPATIENT
Start: 2018-04-11 | End: 2018-04-13

## 2018-04-11 RX ORDER — SENNA PLUS 8.6 MG/1
1 TABLET ORAL AT BEDTIME
Qty: 0 | Refills: 0 | Status: DISCONTINUED | OUTPATIENT
Start: 2018-04-11 | End: 2018-04-13

## 2018-04-11 RX ORDER — LANOLIN ALCOHOL/MO/W.PET/CERES
3 CREAM (GRAM) TOPICAL ONCE
Qty: 0 | Refills: 0 | Status: COMPLETED | OUTPATIENT
Start: 2018-04-11 | End: 2018-04-11

## 2018-04-11 RX ORDER — FUROSEMIDE 40 MG
40 TABLET ORAL ONCE
Qty: 0 | Refills: 0 | Status: COMPLETED | OUTPATIENT
Start: 2018-04-11 | End: 2018-04-11

## 2018-04-11 RX ORDER — POLYETHYLENE GLYCOL 3350 17 G/17G
17 POWDER, FOR SOLUTION ORAL ONCE
Qty: 0 | Refills: 0 | Status: COMPLETED | OUTPATIENT
Start: 2018-04-11 | End: 2018-04-11

## 2018-04-11 RX ADMIN — CAPTOPRIL 6.25 MILLIGRAM(S): 12.5 TABLET ORAL at 22:48

## 2018-04-11 RX ADMIN — Medication 81 MILLIGRAM(S): at 11:46

## 2018-04-11 RX ADMIN — POLYETHYLENE GLYCOL 3350 17 GRAM(S): 17 POWDER, FOR SOLUTION ORAL at 17:20

## 2018-04-11 RX ADMIN — Medication 50 MILLIGRAM(S): at 05:45

## 2018-04-11 RX ADMIN — HEPARIN SODIUM 5000 UNIT(S): 5000 INJECTION INTRAVENOUS; SUBCUTANEOUS at 05:45

## 2018-04-11 RX ADMIN — CAPTOPRIL 6.25 MILLIGRAM(S): 12.5 TABLET ORAL at 13:51

## 2018-04-11 RX ADMIN — SENNA PLUS 1 TABLET(S): 8.6 TABLET ORAL at 22:47

## 2018-04-11 RX ADMIN — SIMETHICONE 80 MILLIGRAM(S): 80 TABLET, CHEWABLE ORAL at 17:22

## 2018-04-11 RX ADMIN — Medication 3 MILLIGRAM(S): at 22:46

## 2018-04-11 RX ADMIN — CLOPIDOGREL BISULFATE 75 MILLIGRAM(S): 75 TABLET, FILM COATED ORAL at 11:46

## 2018-04-11 RX ADMIN — ATORVASTATIN CALCIUM 80 MILLIGRAM(S): 80 TABLET, FILM COATED ORAL at 22:46

## 2018-04-11 RX ADMIN — HEPARIN SODIUM 5000 UNIT(S): 5000 INJECTION INTRAVENOUS; SUBCUTANEOUS at 13:51

## 2018-04-11 RX ADMIN — HEPARIN SODIUM 5000 UNIT(S): 5000 INJECTION INTRAVENOUS; SUBCUTANEOUS at 22:46

## 2018-04-11 RX ADMIN — Medication 100 MILLIGRAM(S): at 17:22

## 2018-04-11 RX ADMIN — Medication 40 MILLIGRAM(S): at 13:51

## 2018-04-11 RX ADMIN — CAPTOPRIL 6.25 MILLIGRAM(S): 12.5 TABLET ORAL at 05:45

## 2018-04-11 NOTE — DISCHARGE NOTE ADULT - HOSPITAL COURSE
85 Creole-speaking woman with history of pre-diabetes and HTN presents as transfer from [facility] for NSTEMI and emergent LHC. Patient initially presented to OSH for shortness of breath accompanied by epigastric abdominal pain for one day duration. Patient lives with son who provided translation at bedside. He noted patient gasping for breath yesterday afternoon complaining of dyspepsia and  epigastric abdominal pain temporarily relieved with Maalox. Patient had similar symptoms approx. 2 weeks ago that also resolved with OTC management. At 4 am today, son noted patient to have difficulty with breathing, appearing in more distress than earlier that day. She had not experienced subjective fevers, chills, nausea, vomiting or changes in appetite. She had intermittent chest pain approx. 1 month ago and went to PMD where EKG reportedly did not show anything of concern. She is a non-smoker with no prior cardiac history. Family history non-contributory.      At [place], patient endorsed left-sided chest pain along with shortness of breath, found with Q waves in leads III, avF; TWI in lead II,III, avF. V5-V6. Patient given ASA/Plavix load and nitroglycerin patch prior to transfer to [facility]. Patient  underwent PCI with RUBEN x2 to pLAD via R. femoral access with placement of intra-aortic balloon pump. LHC also revealed EF 30% and LVEDP 35 for which she was given Lasix 40 IV x1. Cardiac enzymes markedly elevated upon transfer to CCU --> 467; trop 0.91 --> 2.99, CKMB 19.1 --> 25.5.  Left-sided chest pain, epigastric discomfort and shortness of breath .now resolved. Patient only complaint is left lower extremity muscle spasm with pain.   s/p  RUBEN x2 to pLAD and IABP. s/p staged RCA x2 w/ POBA c/b LBBB. IABP removed on 4/10.   Pt on DAPT, lipitor, ACE, BB 85 Creole-speaking woman with history of pre-diabetes and HTN presents as transfer from [facility] for NSTEMI and emergent LHC. Patient initially presented to OSH for shortness of breath accompanied by epigastric abdominal pain for one day duration. Patient lives with son who provided translation at bedside. He noted patient gasping for breath yesterday afternoon complaining of dyspepsia and  epigastric abdominal pain temporarily relieved with Maalox. Patient had similar symptoms approx. 2 weeks ago that also resolved with OTC management. At 4 am today, son noted patient to have difficulty with breathing, appearing in more distress than earlier that day. She had not experienced subjective fevers, chills, nausea, vomiting or changes in appetite. She had intermittent chest pain approx. 1 month ago and went to PMD where EKG reportedly did not show anything of concern. She is a non-smoker with no prior cardiac history. Family history non-contributory.      At [place], patient endorsed left-sided chest pain along with shortness of breath, found with Q waves in leads III, avF; TWI in lead II,III, avF. V5-V6. Patient given ASA/Plavix load and nitroglycerin patch prior to transfer to [facility]. [facility] with placement of intra-aortic balloon pump.  LHC also revealed EF 30% and LVEDP 35 for which she was given Lasix 40 IV x1.  Cardiac enzymes markedly elevated upon transfer to CCU --> 467; trop 0.91 --> 2.99, CKMB 19.1 --> 25.5.  Patient underwent staged PCI with RUBEN to RCA x2 and POBA to right PBA c/b LBBB.  IABP removed on 4/10. Left-sided chest pain, epigastric discomfort, and shortness of breath now resolved.  Patient only complaint is left lower extremity muscle spasm with pain.   Pt on DAPT, lipitor, ACE, BB 85 Creole-speaking woman with history of pre-diabetes and HTN presents as transfer from [facility] for NSTEMI and emergent LHC. Patient initially presented to OSH for shortness of breath accompanied by epigastric abdominal pain for one day duration. Patient lives with son who provided translation at bedside. He noted patient gasping for breath one day prior to admission complaining of dyspepsia and epigastric abdominal pain temporarily relieved with Maalox. Patient had similar symptoms approx. 2 weeks ago that also resolved with OTC management.  She had intermittent chest pain approx. 1 month ago and went to PMD where EKG reportedly did not show anything of concern.     At [place], patient endorsed left-sided chest pain along with shortness of breath, found with Q waves in leads III, avF; TWI in lead II,III, avF. V5-V6. Patient given ASA/Plavix load and nitroglycerin patch prior to transfer to [facility] with NSTEMI. Patient underwent PCI with RUBEN x2 to pLAD via R. femoral access with placement of intra-aortic balloon pump.  LHC also revealed EF 30% and LVEDP 35 for which she was given Lasix 40 IV x1.  Cardiac enzymes markedly elevated upon transfer to CCU --> 467; trop 0.91 --> 2.99, CKMB 19.1 --> 25.5.  Patient underwent staged PCI with RUBEN to RCA x2 and POBA to right PBA c/b LBBB.  IABP removed on 4/10. Left-sided chest pain, epigastric discomfort, and shortness of breath now resolved.  Patient only complaint is left lower extremity muscle spasm with pain.   Pt placed on DAPT, lipitor, ACE, BB     On 4/12, patient developed acute sob due to flash pulmonary edema. cxr with pulm vascular congestion. patient given Lasix IV with improvement of symptoms. 85 Creole-speaking woman with history of pre-diabetes and HTN presents as transfer from [facility] for NSTEMI and emergent LHC. Patient initially presented to OSH for shortness of breath accompanied by epigastric abdominal pain for one day duration. Patient lives with son who provided translation at bedside. He noted patient gasping for breath one day prior to admission complaining of dyspepsia and epigastric abdominal pain temporarily relieved with Maalox. Patient had similar symptoms approx. 2 weeks ago that also resolved with OTC management.  She had intermittent chest pain approx. 1 month ago and went to PMD where EKG reportedly did not show anything of concern.     At [place], patient endorsed left-sided chest pain along with shortness of breath, found with Q waves in leads III, avF; TWI in lead II,III, avF. V5-V6. Patient given ASA/Plavix load and nitroglycerin patch prior to transfer to [facility] with NSTEMI. Patient underwent PCI with RUBEN x2 to pLAD via R. femoral access with placement of intra-aortic balloon pump.  LHC also revealed EF 30% and LVEDP 35 for which she was given Lasix 40 IV x1.  Cardiac enzymes markedly elevated upon transfer to CCU --> 467; trop 0.91 --> 2.99, CKMB 19.1 --> 25.5.  Patient underwent staged PCI with RUBEN to RCA x2 and POBA to right PBA c/b LBBB.  IABP removed on 4/10. Left-sided chest pain, epigastric discomfort, and shortness of breath now resolved.  Patient only complaint is left lower extremity muscle spasm with pain. Pt placed on DAPT, lipitor, ACE, BB     On 4/12, patient developed acute sob due to flash pulmonary edema. cxr with pulm vascular congestion. patient given Lasix IV with improvement of symptoms.   4/13	SOB improved; Discharged home with out patient cariology follow up;

## 2018-04-11 NOTE — DISCHARGE NOTE ADULT - PROVIDER TOKENS
SAVANNAH:'[id number]' TOKEN:'[id number]',FREE:[LAST:[Dr. Dudley Oconnor],FIRST:[Primary Care Doctor],PHONE:[phone]:[(   )    -],ADDRESS:[[address] [place] [address]  [phone]]] TOKEN:'[id number]',FREE:[LAST:[Dr. Dudley Oconnor],FIRST:[Primary Care Doctor],PHONE:[phone]:[(   )    -],ADDRESS:[[address] [place] [address]  [phone]]],TOKEN:'[id number]'

## 2018-04-11 NOTE — PROGRESS NOTE ADULT - SUBJECTIVE AND OBJECTIVE BOX
Patient is a 85y old  Female who presents with a chief complaint of NSTEMI (11 Apr 2018 00:58)    HPI:  85 Creole-speaking woman with history of pre-diabetes and HTN presents as transfer from [facility] for NSTEMI and emergent LHC. Patient initially presented to OSH for shortness of breath accompanied by epigastric abdominal pain for one day duration. Patient lives with son who provided translation at bedside. He noted patient gasping for breath yesterday afternoon complaining of dyspepsia and  epigastric abdominal pain temporarily relieved with Maalox. Patient had similar symptoms approx. 2 weeks ago that also resolved with OTC management. At 4 am today, son noted patient to have difficulty with breathing, appearing in more distress than earlier that day. She had not experienced subjective fevers, chills, nausea, vomiting or changes in appetite. She had intermittent chest pain approx. 1 month ago and went to PMD where EKG reportedly did not show anything of concern. She is a non-smoker with no prior cardiac history. Family history non-contributory.  At [place], patient endorsed left-sided chest pain along with shortness of breath, found with Q waves in leads III, avF; TWI in lead II,III, avF. V5-V6. Patient given ASA/Plavix load and nitroglycerin patch prior to transfer to [facility]. Patient  underwent PCI with RUBEN x2 to [facility] via R. femoral access with placement of intra-aortic balloon pump. LHC also revealed EF 30% and LVEDP 35 for which she was given Lasix 40 IV x1. Cardiac enzymes markedly elevated upon transfer to CCU --> 467; trop 0.91 --> 2.99, CKMB 19.1 --> 25.5.  Left-sided chest pain, epigastric discomfort and shortness of breath now resolved. Patient only complaint is left lower extremity muscle spasm with pain. (08 Apr 2018 21:02)    Overnight Event: 10 beats of NSVT, 20 beats NSVT Lopressor increased, no further episodes of ectopy.    REVIEW OF SYSTEMS:  	    MEDICATIONS  (STANDING):  aspirin enteric coated 81 milliGRAM(s) Oral daily  atorvastatin 80 milliGRAM(s) Oral at bedtime  captopril 6.25 milliGRAM(s) Oral every 8 hours  clopidogrel Tablet 75 milliGRAM(s) Oral daily  dextrose 5%. 1000 milliLiter(s) (50 mL/Hr) IV Continuous <Continuous>  dextrose 50% Injectable 12.5 Gram(s) IV Push once  dextrose 50% Injectable 25 Gram(s) IV Push once  dextrose 50% Injectable 25 Gram(s) IV Push once  heparin  Injectable 5000 Unit(s) SubCutaneous every 8 hours  insulin lispro (HumaLOG) corrective regimen sliding scale   SubCutaneous three times a day before meals  insulin lispro (HumaLOG) corrective regimen sliding scale   SubCutaneous at bedtime  metoprolol succinate ER 50 milliGRAM(s) Oral daily    MEDICATIONS  (PRN):  dextrose Gel 1 Dose(s) Oral once PRN Blood Glucose LESS THAN 70 milliGRAM(s)/deciliter  glucagon  Injectable 1 milliGRAM(s) IntraMuscular once PRN Glucose LESS THAN 70 milligrams/deciliter        PHYSICAL EXAM:  Vital Signs Last 24 Hrs  T(C): 36.6 (11 Apr 2018 06:00), Max: 36.9 (10 Apr 2018 08:00)  T(F): 97.8 (11 Apr 2018 06:00), Max: 98.4 (10 Apr 2018 08:00)  HR: 80 (11 Apr 2018 07:00) (80 - 106)  BP: 98/62 (11 Apr 2018 07:00) (78/54 - 140/73)  BP(mean): 73 (11 Apr 2018 07:00) (60 - 98)  RR: 18 (11 Apr 2018 07:00) (14 - 33)  SpO2: 94% (11 Apr 2018 07:00) (90% - 99%)  I&O's Summary    10 Apr 2018 07:01  -  11 Apr 2018 07:00  --------------------------------------------------------  IN: 980 mL / OUT: 965 mL / NET: 15 mL        Appearance: Normal	  HEENT:   Normal oral mucosa, PERRL, EOMI	  Lymphatic: No lymphadenopathy  Cardiovascular: Normal S1 S2, No JVD, No murmurs, No edema  Respiratory: Lungs clear to auscultation	  Psychiatry: A & O x 3, Mood & affect appropriate  Gastrointestinal:  Soft, Non-tender, + BS	  Skin: No rashes, No ecchymoses, No cyanosis	  Neurologic: Non-focal  Extremities: Normal range of motion, No clubbing, cyanosis or edema  Vascular: Peripheral pulses palpable 2+ bilaterally    LABS:	 	                        13.7   13.2  )-----------( 147      ( 11 Apr 2018 05:33 )             41.2     Auto Eosinophil # 0.5   / Auto Eosinophil % 2.0   / Auto Neutrophil # 6.1   / Auto Neutrophil % 50.0  / BANDS % x                            13.4   9.4   )-----------( 147      ( 10 Apr 2018 04:48 )             39.9     Auto Eosinophil # 0.1   / Auto Eosinophil % 1.1   / Auto Neutrophil # 5.4   / Auto Neutrophil % 57.9  / BANDS % x                            13.4   10.3  )-----------( 163      ( 09 Apr 2018 16:46 )             40.6     Auto Eosinophil # 0.4   / Auto Eosinophil % 3.4   / Auto Neutrophil # 5.8   / Auto Neutrophil % 55.7  / BANDS % x        INR: 1.19 ratio (04-11 @ 05:33)  INR: 1.25 ratio (04-09 @ 07:03)  INR: 1.26 ratio (04-08 @ 17:21)    04-11    135  |  96  |  39<H>  ----------------------------<  123<H>  4.6   |  25  |  1.28  04-10    131<L>  |  93<L>  |  35<H>  ----------------------------<  157<H>  4.7   |  24  |  1.22  04-10    136  |  94<L>  |  25<H>  ----------------------------<  139<H>  3.9   |  27  |  1.04    Ca    9.3      11 Apr 2018 05:33  Mg     2.5     04-11  Phos  5.5     04-11  TPro  7.7  /  Alb  3.5  /  TBili  0.7  /  DBili  x   /  AST  32  /  ALT  49<H>  /  AlkPhos  76  04-11  TPro  7.8  /  Alb  3.6  /  TBili  0.5  /  DBili  x   /  AST  35  /  ALT  53<H>  /  AlkPhos  81  04-10  TPro  7.7  /  Alb  3.7  /  TBili  1.1  /  DBili  x   /  AST  63<H>  /  ALT  68<H>  /  AlkPhos  91  04-10      proBNP:   Lipid Profile: 04-08 Chol 243<H> <H> HDL 54 Trig 58  HgA1c: 7.2 % (04-08 @ 17:54)    TSH: Thyroid Stimulating Hormone, Serum: 2.12 uIU/mL (04-08 @ 17:54)      CARDIAC MARKERS:   10 Apr 2018 04:48 Troponin 1.29 ng/mL / Creatine Kinase 245 U/L /  CKMB 5.3 ng/mL / CPK Mass Assay % 2.2 %   09 Apr 2018 16:23 Troponin 1.61 ng/mL / Creatine Kinase 322 U/L /  CKMB 7.1 ng/mL / CPK Mass Assay % 2.2 %   09 Apr 2018 07:05 Troponin 1.52 ng/mL / Creatine Kinase 397 U/L /  CKMB 10.5 ng/mL / CPK Mass Assay % 2.6 %      TELEMETRY: PAC's  ECG:  	  RADIOLOGY:  OTHER: 	    PREVIOUS DIAGNOSTIC TESTING:    [ ] Echocardiogram: < from: TTE with Doppler (w/Cont) (04.09.18 @ 06:38) >  Conclusions: EF 40%  1. Tethered mitral valve leaflets with normal opening.  Mild-moderate mitral regurgitation.  2. Calcified trileaflet aortic valve with normal opening.  Mild aortic regurgitation.  3. Moderately dilated left atrium.  LA volume index = 43  cc/m2.  4. Moderate global left ventricular systolic dysfunction.  5. The rightventricle is not well visualized; grossly  normal right ventricular systolic function.  6. Normal tricuspid valve. Mild-moderate tricuspid  regurgitation.  *** No previous Echo exam.    < end of copied text >    [ ]  Catheterization: < from: Cardiac Cath Lab - Adult (04.08.18 @ 12:21) >  VENTRICLES: Global left ventricular function was severely depressed. EF  estimated was 35 %.  CORONARY VESSELS: Thecoronary circulation is right dominant.  LM:   --  LM: Normal.  LAD:   --  Proximal LAD: There was a 100 % stenosis.  CX:   --  OM1: There was a 90 % stenosis.  RCA:   --  Mid RCA: There was a 90 % stenosis.  --  Distal RCA: There was a 99 % stenosis.  --  RPDA: There was a 90 % stenosis.    < end of copied text >   	  	  FARHAD [facility]  Contact # [phone]

## 2018-04-11 NOTE — DISCHARGE NOTE ADULT - INSTRUCTIONS
DASH/TLC/COnsistent Carb Choose lean meats and poultry without skin and prepare them without added saturated and trans fat.  Eat fish at least twice a week. Recent research shows that eating oily fish containing omega-3 fatty acids (for example, salmon, trout and herring) may help lower your risk of death from coronary artery disease.  Select fat-free, 1 percent fat and low-fat dairy products.  Cut back on foods containing partially hydrogenated vegetable oils to reduce trans fat in your diet.   To lower cholesterol, reduce saturated fat to no more than 5 to 6 percent of total calories. For someone eating 2,000 calories a day, that’s about 13 grams of saturated fat.  Cut back on beverages and foods with added sugars.  Choose and prepare foods with little or no salt. To lower blood pressure, aim to eat no more than 2,400 milligrams of sodium per day. Reducing daily intake to 1,500 mg is desirable because it can lower blood pressure even further.  If you drink alcohol, drink in moderation. That means one drink per day if you’re a woman and two drinks  per day if you’re a man.  Follow the American Heart Association recommendations when you eat out, and keep an eye on your portion sizes.

## 2018-04-11 NOTE — DISCHARGE NOTE ADULT - PLAN OF CARE
remain CP free Call your doctor if you have unusual chest pain, pressure, or discomfort, shortness of breath, nausea, vomiting, burping, heartburn, tingling upper body parts, sweating, cold, clammy skin, racing heartbeat  Call 911 if you think you are having a heart attack  Take all cardiac medications as prescribed - notify your doctor if you have any side effects  Follow cardiac diet - avoid fatty & fried foods, don't eat too much red meat, eat lots of fruits & vegetables, dairy products should be low fat  Lose weight if you are overweight  Become more active with walking, gardening, or any other activity that gets you to move HgA1C this admission 7.2  Make sure you get your HgA1c checked every three months.  If you take oral diabetes medications, check your blood glucose two times a day.  If you take insulin, check your blood glucose before meals and at bedtime.  It's important not to skip any meals.  Keep a log of your blood glucose results and always take it with you to your doctor appointments.  Keep a list of your current medications including injectables and over the counter medications and bring this medication list with you to all your doctor appointments.  If you have not seen your ophthalmologist this year call for appointment.  Check your feet daily for redness, sores, or openings. Do not self treat. If no improvement in two days call your primary care physician for an appointment.  Low blood sugar (hypoglycemia) is a blood sugar below 70mg/dl. Check your blood sugar if you feel signs/symptoms of hypoglycemia. If your blood sugar is below 70 take 15 grams of carbohydrates (ex 4 oz of apple juice, 3-4 glucose tablets, or 4-6 oz of regular soda) wait 15 minutes and repeat blood sugar to make sure it comes up above 70.  If your blood sugar is above 70 and you are due for a meal, have a meal.  If you are not due for a meal have a snack.  This snack helps keeps your blood sugar at a safe range. Call your doctor if you have unusual chest pain, pressure, or discomfort, shortness of breath, nausea, vomiting, burping, heartburn, tingling upper body parts, sweating, cold, clammy skin, racing heartbeat  Call 911 if you think you are having a heart attack  Take all cardiac medications as prescribed - notify your doctor if you have any side effects  Follow cardiac diet - avoid fatty & fried foods, don't eat too much red meat, eat lots of fruits & vegetables, dairy products should be low fat  Lose weight if you are overweight  Become more active with walking, gardening, or any other activity that gets you to move.  No heavy lifting, strenuous activity, bending, straining or unnecessary stair climbing  for 2 weeks. No sex for 1 week.  No driving for 2 days. You may shower 24 hours following procedure but avoid baths and swimming for 1 week. Check groin site for bleeding and/or swelling daily following procedure. Call your doctor/cardiologist immediately should it occur or if you have increased/persistent pain at the site. Follow up with your cardiologist in 1- 2 weeks. You may call [facility] at [phone] or [phone] after office hours and weekends  with any questions or concerns following your procedure. Take medications as prescribed. Coronary artery disease is a condition where the arteries the supply the heart muscle get clogged with fatty deposits & puts you at risk for a heart attack.  Call your doctor if you have any new pain, pressure, or discomfort in the center of your chest, pain, tingling or discomfort in arms, back, neck, jaw, or stomach, shortness of breath, nausea, vomiting, burping or heartburn, sweating, cold and clammy skin, racing or abnormal heartbeat for more than 10 minutes or if they keep coming & going.  Call 911 and do not try to get to hospital by car.  You can help yourself with lifestyle changes (quitting smoking if you smoke), eat lots of fruits & vegetables & low fat dairy products, not a lot of meat & fatty foods, walk or some form of physical activity most days of the week, lose weight if you are overweight.  Take your cardiac medication as prescribed to lower cholesterol, to lower blood pressure, and control your blood sugar.    No heavy lifting, strenuous activity, bending, straining or unnecessary stair climbing  for 2 weeks.  No sex for 1 week.  No driving for 2 days. You may shower 24 hours following procedure but avoid baths and swimming for 1 week.      Check groin site for bleeding and/or swelling daily following procedure.  Call your doctor/cardiologist immediately should it occur or if you have increased/persistent pain at the site.      Follow up with your cardiologist within 1 week. You may call [facility] at [phone] or [phone] after office hours and weekends with any questions or concerns following your procedure. Take all medications as prescribed.  Stop smoking if you currently smoke, and avoid high altitudes.  Weigh yourself daily.  If you gain 3lbs in 3 days, or 5lbs in a week call your Health Care Provider.  Eat a low sodium diet.  If you have pulmonary hypertension and you are a female of child bearing age, talk to your caregiver about using birth control pills or getting pregnant.  Call your Health Care Provider if you have any swelling or increased swelling in your feet, ankles, and/or stomach.  If you experience dizziness, chest pain, or shortness of breath, seek immediate medical attention. Make sure you get your HgA1c checked every three months.  If you take oral diabetes medications, check your blood glucose at least two times a day.  If you take short-acting insulin, check your blood glucose before meals and at bedtime.  It's important not to skip any meals.  Keep a log of your blood glucose results and always take it with you to your doctor appointments.  Keep a list of your current medications including over the counter medications and bring this medication list with you to all your doctor appointments.  If you have not seen your ophthalmologist this year, call for appointment.  Check your feet daily for redness, sores, or openings.  Do not self treat.  If there is no improvement in two days, call your primary care physician for an appointment.    HgA1c this admission was 7.2.  Patient agrees to follow up with her Primary Care Doctor within 1 week.

## 2018-04-11 NOTE — DISCHARGE NOTE ADULT - MEDICATION SUMMARY - MEDICATIONS TO STOP TAKING
I will STOP taking the medications listed below when I get home from the hospital:    phentermine 37.5 mg oral capsule  -- 1 cap(s) by mouth once a day    losartan-hydrochlorothiazide 50mg-12.5mg oral tablet  -- 1 tab(s) by mouth once a day    azithromycin 500 mg oral tablet  -- 1 tab(s) by mouth once a day I will STOP taking the medications listed below when I get home from the hospital:    phentermine 37.5 mg oral capsule  -- 1 cap(s) by mouth once a day    losartan-hydrochlorothiazide 50mg-12.5mg oral tablet  -- 1 tab(s) by mouth once a day    azithromycin 500 mg oral tablet  -- 1 tab(s) by mouth once a day    Entresto 24 mg-26 mg oral tablet  -- 1 tab(s) by mouth 2 times a day - start on the Morning of 4/15/2018  -- Check with your doctor before becoming pregnant.  Obtain medical advice before taking any non-prescription drugs as some may affect the action of this medication.  Store this medication in the original package.

## 2018-04-11 NOTE — DISCHARGE NOTE ADULT - CARE PROVIDER_API CALL
Chance Nguyen), Cardiovascular Disease; Internal Medicine  [address] [place] [address]  Phone: [phone]  Fax: [phone] Alin Treviño), Cardiology  [address] [place] [address]  Phone: [phone]  Fax: [phone]    Dr. Dudley Oconnor, Primary Care Doctor  [address] [place] [address]  [id number]  Phone: [phone]  Fax: (   )    - Alin Treviño), Cardiology  [address] [place] [address]  Phone: [phone]  Fax: [phone]    Dr. Dudley Oconnor, Primary Care Doctor  [address] [place] [address]  [id number]  Phone: [phone]  Fax: (   )    -    Kasia Magdaleno), Internal Medicine  [address] [place] [address]  Phone: [phone]  Fax: [phone]

## 2018-04-11 NOTE — DISCHARGE NOTE ADULT - CARE PROVIDERS DIRECT ADDRESSES
[email or web address] ,DirectAddress_Unknown,DirectAddress_Unknown ,DirectAddress_Unknown,DirectAddress_Unknown,[email or web address]

## 2018-04-11 NOTE — DISCHARGE NOTE ADULT - ADDITIONAL INSTRUCTIONS
f/u with cardiologist in 1-2 weeks Follow up with your Primary Care Doctor within 1 week.  Follow up with your Cardiologist within 1 week.

## 2018-04-11 NOTE — DISCHARGE NOTE ADULT - MEDICATION SUMMARY - MEDICATIONS TO TAKE
I will START or STAY ON the medications listed below when I get home from the hospital:    Rolling Walker  -- Indication: For Fall prevention    aspirin 81 mg oral delayed release tablet  -- 1 tab(s) by mouth once a day  -- Indication: For CAD    lisinopril 5 mg oral tablet  -- 1 tab(s) by mouth once a day   -- Do not take this drug if you are pregnant.  It is very important that you take or use this exactly as directed.  Do not skip doses or discontinue unless directed by your doctor.  Some non-prescription drugs may aggravate your condition.  Read all labels carefully.  If a warning appears, check with your doctor before taking.    -- Indication: For NSTEMI (non-ST elevated myocardial infarction)    metFORMIN 500 mg oral tablet  -- 1 tab(s) by mouth 2 times a day  -- Indication: For Diabetes mellitus, type 2    atorvastatin 40 mg oral tablet  -- 1 tab(s) by mouth once a day (at bedtime)  -- Indication: For Coronary Artery Disease    clopidogrel 75 mg oral tablet  -- 1 tab(s) by mouth once a day  -- Indication: For Coronary Artery Disease    metoprolol succinate 25 mg oral tablet, extended release  -- 3 tab(s) by mouth once a day  -- Indication: For NSTEMI (non-ST elevated myocardial infarction)    furosemide 40 mg oral tablet  -- 1 tab(s) by mouth once a day  -- Indication: For Heart failure with reduced ejection fraction, NYHA class II    senna oral tablet  -- 1 tab(s) by mouth once a day (at bedtime)  -- Indication: For Bowel regimen    docusate sodium 100 mg oral capsule  -- 1 cap(s) by mouth 2 times a day  -- Indication: For Bowel regimen    simethicone 80 mg oral tablet, chewable  -- 1 tab(s) by mouth 2 times a day  -- Indication: For Gas I will START or STAY ON the medications listed below when I get home from the hospital:    Rolling Walker  -- Indication: For Fall prevention    aspirin 81 mg oral delayed release tablet  -- 1 tab(s) by mouth once a day  -- Indication: For CAD    lisinopril 5 mg oral tablet  -- 1 tab(s) by mouth once a day   -- Do not take this drug if you are pregnant.  It is very important that you take or use this exactly as directed.  Do not skip doses or discontinue unless directed by your doctor.  Some non-prescription drugs may aggravate your condition.  Read all labels carefully.  If a warning appears, check with your doctor before taking.    -- Indication: For NSTEMI (non-ST elevated myocardial infarction)    metFORMIN 500 mg oral tablet  -- 1 tab(s) by mouth 2 times a day  -- Indication: For Diabetes mellitus, type 2    atorvastatin 40 mg oral tablet  -- 1 tab(s) by mouth once a day (at bedtime)  -- Indication: For Coronary Artery Disease    clopidogrel 75 mg oral tablet  -- 1 tab(s) by mouth once a day  -- Indication: For Coronary Artery Disease    metoprolol succinate 25 mg oral tablet, extended release  -- 3 tab(s) by mouth once a day  -- Indication: For NSTEMI (non-ST elevated myocardial infarction)    furosemide 20 mg oral tablet  -- 3 tab(s) by mouth 2 times a day  -- Indication: For chf    senna oral tablet  -- 1 tab(s) by mouth once a day (at bedtime)  -- Indication: For Bowel regimen    docusate sodium 100 mg oral capsule  -- 1 cap(s) by mouth 2 times a day  -- Indication: For Bowel regimen    simethicone 80 mg oral tablet, chewable  -- 1 tab(s) by mouth 2 times a day  -- Indication: For Gas

## 2018-04-11 NOTE — DISCHARGE NOTE ADULT - FINDINGS/TREATMENT
< from: Cardiac Cath Lab - Adult (04.08.18 @ 12:21) >        < end of copied text > < from: Cardiac Cath Lab - Adult (04.09.18 @ 15:13) >        < end of copied text > RUBEN x2 LAD  RUBEN x2 RCA

## 2018-04-11 NOTE — PROGRESS NOTE ADULT - ASSESSMENT
84 y/o [place] speaking woman with history of HTN and pre-diabetes transferred from [place] for NSTEMI and emergent LHC/ Underwent PCI with DESx2 to [facility] with Ef 30% being transferred to medicine:

## 2018-04-11 NOTE — PROGRESS NOTE ADULT - SUBJECTIVE AND OBJECTIVE BOX
MEDICINE ACCEPT NOTE     Patient is a 85y old  Female who presents with a chief complaint of NSTEMI (11 Apr 2018 00:58)    BRIEF HOSPITAL COURSE: 86 y/o [place] speaking woman with history of HTN and pre-diabetes transferred from [place] for NSTEMI and emergent [facility] PCI with DESx2 to pLAD via right femoral access with placement of intra-aortic balloon pump. During cath, EF was 30%. She had some ventricular ectopy in the CCU and lopressor was increased. Now being transferred to medicine.       SUBJECTIVE / OVERNIGHT EVENTS: patient reports "gas pain" in her abdomen, started after she had lunch today. She also has not moved her bowels since admission. She denies chest pain/chest pressure.     MEDICATIONS  (STANDING):  aspirin enteric coated 81 milliGRAM(s) Oral daily  atorvastatin 80 milliGRAM(s) Oral at bedtime  captopril 6.25 milliGRAM(s) Oral every 8 hours  clopidogrel Tablet 75 milliGRAM(s) Oral daily  dextrose 5%. 1000 milliLiter(s) (50 mL/Hr) IV Continuous <Continuous>  dextrose 50% Injectable 12.5 Gram(s) IV Push once  dextrose 50% Injectable 25 Gram(s) IV Push once  dextrose 50% Injectable 25 Gram(s) IV Push once  docusate sodium 100 milliGRAM(s) Oral two times a day  heparin  Injectable 5000 Unit(s) SubCutaneous every 8 hours  insulin lispro (HumaLOG) corrective regimen sliding scale   SubCutaneous three times a day before meals  insulin lispro (HumaLOG) corrective regimen sliding scale   SubCutaneous at bedtime  metoprolol succinate ER 50 milliGRAM(s) Oral daily  polyethylene glycol 3350 17 Gram(s) Oral once  senna 1 Tablet(s) Oral at bedtime    MEDICATIONS  (PRN):  dextrose Gel 1 Dose(s) Oral once PRN Blood Glucose LESS THAN 70 milliGRAM(s)/deciliter  glucagon  Injectable 1 milliGRAM(s) IntraMuscular once PRN Glucose LESS THAN 70 milligrams/deciliter      Vital Signs Last 24 Hrs  T(C): 36.8 (11 Apr 2018 13:00), Max: 36.8 (11 Apr 2018 13:00)  T(F): 98.2 (11 Apr 2018 13:00), Max: 98.2 (11 Apr 2018 13:00)  HR: 89 (11 Apr 2018 15:00) (80 - 106)  BP: 104/55 (11 Apr 2018 15:00) (90/65 - 140/73)  BP(mean): 71 (11 Apr 2018 15:00) (71 - 98)  RR: 23 (11 Apr 2018 15:00) (12 - 27)  SpO2: 91% (11 Apr 2018 15:00) (91% - 99%)  CAPILLARY BLOOD GLUCOSE      POCT Blood Glucose.: 139 mg/dL (11 Apr 2018 13:11)  POCT Blood Glucose.: 110 mg/dL (11 Apr 2018 08:29)  POCT Blood Glucose.: 140 mg/dL (10 Apr 2018 21:56)  POCT Blood Glucose.: 163 mg/dL (10 Apr 2018 17:02)    I&O's Summary    10 Apr 2018 07:01  -  11 Apr 2018 07:00  --------------------------------------------------------  IN: 980 mL / OUT: 965 mL / NET: 15 mL    11 Apr 2018 07:01  -  11 Apr 2018 15:23  --------------------------------------------------------  IN: 240 mL / OUT: 300 mL / NET: -60 mL        PHYSICAL EXAM:  GENERAL: NAD, resting in chair   HEAD:  Atraumatic, Normocephalic  EYES: EOMI, PERRLA, conjunctiva and sclera clear  NECK: Supple, No JVD  CHEST/LUNG: Clear to auscultation bilaterally; No wheeze  HEART: Regular rate and rhythm; No murmurs  ABDOMEN: Soft, non-tender, non-distended.   EXTREMITIES:  2+ Peripheral Pulses, No clubbing, cyanosis, or edema  PSYCH: AAOx3  NEUROLOGY: non-focal  SKIN: No rashes or lesions    LABS:                        13.7   13.2  )-----------( 147      ( 11 Apr 2018 05:33 )             41.2     04-11    135  |  96  |  39<H>  ----------------------------<  123<H>  4.6   |  25  |  1.28    Ca    9.3      11 Apr 2018 05:33  Phos  5.5     04-11  Mg     2.5     04-11    TPro  7.7  /  Alb  3.5  /  TBili  0.7  /  DBili  x   /  AST  32  /  ALT  49<H>  /  AlkPhos  76  04-11    PT/INR - ( 11 Apr 2018 05:33 )   PT: 13.0 sec;   INR: 1.19 ratio         PTT - ( 11 Apr 2018 05:33 )  PTT:28.5 sec  CARDIAC MARKERS ( 10 Apr 2018 04:48 )  x     / 1.29 ng/mL / 245 U/L / x     / 5.3 ng/mL  CARDIAC MARKERS ( 09 Apr 2018 16:23 )  x     / 1.61 ng/mL / 322 U/L / x     / 7.1 ng/mL          RADIOLOGY & ADDITIONAL TESTS:    Imaging Personally Reviewed:    Consultant(s) Notes Reviewed:      Care Discussed with Consultants/Other Providers: MEDICINE ACCEPT NOTE     Patient is a 85y old  Female who presents with a chief complaint of NSTEMI (11 Apr 2018 00:58)    BRIEF HOSPITAL COURSE: 86 y/o [place] speaking woman with history of HTN and pre-diabetes transferred from [place] for NSTEMI and emergent LHC/ underwent PCI with DESx2 to pLAD via right femoral access with placement of intra-aortic balloon pump. During cath, EF was 30%. She had some ventricular ectopy in the CCU and lopressor was increased. Now being transferred to medicine.       SUBJECTIVE / OVERNIGHT EVENTS: patient reports "gas pain" in her abdomen, started after she had lunch today. She also has not moved her bowels since admission. She denies chest pain/chest pressure.     MEDICATIONS  (STANDING):  aspirin enteric coated 81 milliGRAM(s) Oral daily  atorvastatin 80 milliGRAM(s) Oral at bedtime  captopril 6.25 milliGRAM(s) Oral every 8 hours  clopidogrel Tablet 75 milliGRAM(s) Oral daily  dextrose 5%. 1000 milliLiter(s) (50 mL/Hr) IV Continuous <Continuous>  dextrose 50% Injectable 12.5 Gram(s) IV Push once  dextrose 50% Injectable 25 Gram(s) IV Push once  dextrose 50% Injectable 25 Gram(s) IV Push once  docusate sodium 100 milliGRAM(s) Oral two times a day  heparin  Injectable 5000 Unit(s) SubCutaneous every 8 hours  insulin lispro (HumaLOG) corrective regimen sliding scale   SubCutaneous three times a day before meals  insulin lispro (HumaLOG) corrective regimen sliding scale   SubCutaneous at bedtime  metoprolol succinate ER 50 milliGRAM(s) Oral daily  polyethylene glycol 3350 17 Gram(s) Oral once  senna 1 Tablet(s) Oral at bedtime    MEDICATIONS  (PRN):  dextrose Gel 1 Dose(s) Oral once PRN Blood Glucose LESS THAN 70 milliGRAM(s)/deciliter  glucagon  Injectable 1 milliGRAM(s) IntraMuscular once PRN Glucose LESS THAN 70 milligrams/deciliter      Vital Signs Last 24 Hrs  T(C): 36.8 (11 Apr 2018 13:00), Max: 36.8 (11 Apr 2018 13:00)  T(F): 98.2 (11 Apr 2018 13:00), Max: 98.2 (11 Apr 2018 13:00)  HR: 89 (11 Apr 2018 15:00) (80 - 106)  BP: 104/55 (11 Apr 2018 15:00) (90/65 - 140/73)  BP(mean): 71 (11 Apr 2018 15:00) (71 - 98)  RR: 23 (11 Apr 2018 15:00) (12 - 27)  SpO2: 91% (11 Apr 2018 15:00) (91% - 99%)  CAPILLARY BLOOD GLUCOSE      POCT Blood Glucose.: 139 mg/dL (11 Apr 2018 13:11)  POCT Blood Glucose.: 110 mg/dL (11 Apr 2018 08:29)  POCT Blood Glucose.: 140 mg/dL (10 Apr 2018 21:56)  POCT Blood Glucose.: 163 mg/dL (10 Apr 2018 17:02)    I&O's Summary    10 Apr 2018 07:01  -  11 Apr 2018 07:00  --------------------------------------------------------  IN: 980 mL / OUT: 965 mL / NET: 15 mL    11 Apr 2018 07:01  -  11 Apr 2018 15:23  --------------------------------------------------------  IN: 240 mL / OUT: 300 mL / NET: -60 mL      PHYSICAL EXAM:  GENERAL: NAD, resting in chair   HEAD:  Atraumatic, Normocephalic  EYES: EOMI, PERRLA, conjunctiva and sclera clear  NECK: Supple, No JVD  CHEST/LUNG: Clear to auscultation bilaterally; No wheeze  HEART: Regular rate and rhythm; No murmurs  ABDOMEN: Soft, non-tender, non-distended.   EXTREMITIES:  no peripheral edema   NEUROLOGY: non-focal      LABS:                        13.7   13.2  )-----------( 147      ( 11 Apr 2018 05:33 )             41.2     04-11    135  |  96  |  39<H>  ----------------------------<  123<H>  4.6   |  25  |  1.28    Ca    9.3      11 Apr 2018 05:33  Phos  5.5     04-11  Mg     2.5     04-11    TPro  7.7  /  Alb  3.5  /  TBili  0.7  /  DBili  x   /  AST  32  /  ALT  49<H>  /  AlkPhos  76  04-11    PT/INR - ( 11 Apr 2018 05:33 )   PT: 13.0 sec;   INR: 1.19 ratio         PTT - ( 11 Apr 2018 05:33 )  PTT:28.5 sec  CARDIAC MARKERS ( 10 Apr 2018 04:48 )  x     / 1.29 ng/mL / 245 U/L / x     / 5.3 ng/mL  CARDIAC MARKERS ( 09 Apr 2018 16:23 )  x     / 1.61 ng/mL / 322 U/L / x     / 7.1 ng/mL

## 2018-04-11 NOTE — DISCHARGE NOTE ADULT - OTHER SIGNIFICANT FINDINGS
< from: Limited Transthoracic Echo (w/Cont) (04.10.18 @ 15:10) >  CONCLUSIONS:  1. Endocardial visualization enhanced with intravenous  injection of echo contrast (Definity). No left ventricular  thrombus.    < end of copied text >    < from: TTE with Doppler (w/Cont) (04.09.18 @ 06:38) >  Conclusions:  1. Tethered mitral valve leaflets with normal opening.  Mild-moderate mitral regurgitation.  2. Calcified trileaflet aortic valve with normal opening.  Mild aortic regurgitation.  3. Moderately dilated left atrium.  LA volume index = 43  cc/m2.  4. Moderate global left ventricular systolic dysfunction.  5. The rightventricle is not well visualized; grossly  normal right ventricular systolic function.  6. Normal tricuspid valve. Mild-moderate tricuspid  regurgitation.  *** No previous Echo exam.    < end of copied text >

## 2018-04-11 NOTE — DISCHARGE NOTE ADULT - PATIENT PORTAL LINK FT
You can access the [facility] Patient Portal, offered by [facility], by registering with the following website: [email or web address]

## 2018-04-11 NOTE — DISCHARGE NOTE ADULT - CARE PLAN
Principal Discharge DX:	NSTEMI (non-ST elevated myocardial infarction)  Goal:	remain CP free  Assessment and plan of treatment:	Call your doctor if you have unusual chest pain, pressure, or discomfort, shortness of breath, nausea, vomiting, burping, heartburn, tingling upper body parts, sweating, cold, clammy skin, racing heartbeat  Call 911 if you think you are having a heart attack  Take all cardiac medications as prescribed - notify your doctor if you have any side effects  Follow cardiac diet - avoid fatty & fried foods, don't eat too much red meat, eat lots of fruits & vegetables, dairy products should be low fat  Lose weight if you are overweight  Become more active with walking, gardening, or any other activity that gets you to move  Secondary Diagnosis:	Diabetes mellitus, type 2  Assessment and plan of treatment:	HgA1C this admission 7.2  Make sure you get your HgA1c checked every three months.  If you take oral diabetes medications, check your blood glucose two times a day.  If you take insulin, check your blood glucose before meals and at bedtime.  It's important not to skip any meals.  Keep a log of your blood glucose results and always take it with you to your doctor appointments.  Keep a list of your current medications including injectables and over the counter medications and bring this medication list with you to all your doctor appointments.  If you have not seen your ophthalmologist this year call for appointment.  Check your feet daily for redness, sores, or openings. Do not self treat. If no improvement in two days call your primary care physician for an appointment.  Low blood sugar (hypoglycemia) is a blood sugar below 70mg/dl. Check your blood sugar if you feel signs/symptoms of hypoglycemia. If your blood sugar is below 70 take 15 grams of carbohydrates (ex 4 oz of apple juice, 3-4 glucose tablets, or 4-6 oz of regular soda) wait 15 minutes and repeat blood sugar to make sure it comes up above 70.  If your blood sugar is above 70 and you are due for a meal, have a meal.  If you are not due for a meal have a snack.  This snack helps keeps your blood sugar at a safe range. Principal Discharge DX:	NSTEMI (non-ST elevated myocardial infarction)  Goal:	remain CP free  Assessment and plan of treatment:	Call your doctor if you have unusual chest pain, pressure, or discomfort, shortness of breath, nausea, vomiting, burping, heartburn, tingling upper body parts, sweating, cold, clammy skin, racing heartbeat  Call 911 if you think you are having a heart attack  Take all cardiac medications as prescribed - notify your doctor if you have any side effects  Follow cardiac diet - avoid fatty & fried foods, don't eat too much red meat, eat lots of fruits & vegetables, dairy products should be low fat  Lose weight if you are overweight  Become more active with walking, gardening, or any other activity that gets you to move.  No heavy lifting, strenuous activity, bending, straining or unnecessary stair climbing  for 2 weeks. No sex for 1 week.  No driving for 2 days. You may shower 24 hours following procedure but avoid baths and swimming for 1 week. Check groin site for bleeding and/or swelling daily following procedure. Call your doctor/cardiologist immediately should it occur or if you have increased/persistent pain at the site. Follow up with your cardiologist in 1- 2 weeks. You may call [facility] at [phone] or [phone] after office hours and weekends  with any questions or concerns following your procedure. Take medications as prescribed.  Secondary Diagnosis:	Diabetes mellitus, type 2  Assessment and plan of treatment:	HgA1C this admission 7.2  Make sure you get your HgA1c checked every three months.  If you take oral diabetes medications, check your blood glucose two times a day.  If you take insulin, check your blood glucose before meals and at bedtime.  It's important not to skip any meals.  Keep a log of your blood glucose results and always take it with you to your doctor appointments.  Keep a list of your current medications including injectables and over the counter medications and bring this medication list with you to all your doctor appointments.  If you have not seen your ophthalmologist this year call for appointment.  Check your feet daily for redness, sores, or openings. Do not self treat. If no improvement in two days call your primary care physician for an appointment.  Low blood sugar (hypoglycemia) is a blood sugar below 70mg/dl. Check your blood sugar if you feel signs/symptoms of hypoglycemia. If your blood sugar is below 70 take 15 grams of carbohydrates (ex 4 oz of apple juice, 3-4 glucose tablets, or 4-6 oz of regular soda) wait 15 minutes and repeat blood sugar to make sure it comes up above 70.  If your blood sugar is above 70 and you are due for a meal, have a meal.  If you are not due for a meal have a snack.  This snack helps keeps your blood sugar at a safe range. Principal Discharge DX:	NSTEMI (non-ST elevated myocardial infarction)  Goal:	remain CP free  Assessment and plan of treatment:	Coronary artery disease is a condition where the arteries the supply the heart muscle get clogged with fatty deposits & puts you at risk for a heart attack.  Call your doctor if you have any new pain, pressure, or discomfort in the center of your chest, pain, tingling or discomfort in arms, back, neck, jaw, or stomach, shortness of breath, nausea, vomiting, burping or heartburn, sweating, cold and clammy skin, racing or abnormal heartbeat for more than 10 minutes or if they keep coming & going.  Call 911 and do not try to get to hospital by car.  You can help yourself with lifestyle changes (quitting smoking if you smoke), eat lots of fruits & vegetables & low fat dairy products, not a lot of meat & fatty foods, walk or some form of physical activity most days of the week, lose weight if you are overweight.  Take your cardiac medication as prescribed to lower cholesterol, to lower blood pressure, and control your blood sugar.    No heavy lifting, strenuous activity, bending, straining or unnecessary stair climbing  for 2 weeks.  No sex for 1 week.  No driving for 2 days. You may shower 24 hours following procedure but avoid baths and swimming for 1 week.      Check groin site for bleeding and/or swelling daily following procedure.  Call your doctor/cardiologist immediately should it occur or if you have increased/persistent pain at the site.      Follow up with your cardiologist within 1 week. You may call [facility] at [phone] or [phone] after office hours and weekends with any questions or concerns following your procedure.  Secondary Diagnosis:	Systolic CHF, acute  Assessment and plan of treatment:	Take all medications as prescribed.  Stop smoking if you currently smoke, and avoid high altitudes.  Weigh yourself daily.  If you gain 3lbs in 3 days, or 5lbs in a week call your Health Care Provider.  Eat a low sodium diet.  If you have pulmonary hypertension and you are a female of child bearing age, talk to your caregiver about using birth control pills or getting pregnant.  Call your Health Care Provider if you have any swelling or increased swelling in your feet, ankles, and/or stomach.  If you experience dizziness, chest pain, or shortness of breath, seek immediate medical attention.  Secondary Diagnosis:	Diabetes mellitus, type 2  Assessment and plan of treatment:	Make sure you get your HgA1c checked every three months.  If you take oral diabetes medications, check your blood glucose at least two times a day.  If you take short-acting insulin, check your blood glucose before meals and at bedtime.  It's important not to skip any meals.  Keep a log of your blood glucose results and always take it with you to your doctor appointments.  Keep a list of your current medications including over the counter medications and bring this medication list with you to all your doctor appointments.  If you have not seen your ophthalmologist this year, call for appointment.  Check your feet daily for redness, sores, or openings.  Do not self treat.  If there is no improvement in two days, call your primary care physician for an appointment.    HgA1c this admission was 7.2.  Patient agrees to follow up with her Primary Care Doctor within 1 week.

## 2018-04-11 NOTE — CHART NOTE - NSCHARTNOTEFT_GEN_A_CORE
85F Cre[place]-speaking with pre-DM and HTN presents for late presentation NSTEMI with C revealing triple vessel disease s/p RUBEN to pLAD and IABP placement for elevated LVEDP, s/p staged RUBEN to RCA and removal of IABP.       Problem/Plan  ·  Problem: NSTEMI (non-ST elevated myocardial infarction).  Plan: -Continue ASA, plavix, statin   -Continue Toprol XL 50mg QD and transition captopril to lisinopril.      Problem/Plan  ·  Problem: Systolic CHF, acute.  Plan: -Continue Toprol XL 50mg QD and transition captopril to lisinopril  -will give lasix 40mg IVPx1 today and start on 40mg QD tomorrow.  DISPO: PT eval for deconditioning    Ynes Phillips, AG-ACNP  [id number]

## 2018-04-11 NOTE — PROGRESS NOTE ADULT - ATTENDING COMMENTS
Patient seen and examined. Agree with assessment and plan as outlined above. 86 yo woman s/p LAD infarct with staged PCI to RCA s/p IABP removal on 4/10. Patient hemodynamically stable. Bilateral crackles at bases. Gentle PO lasix. Bowel regimen. For telemetry transfer.

## 2018-04-12 DIAGNOSIS — I50.20 UNSPECIFIED SYSTOLIC (CONGESTIVE) HEART FAILURE: ICD-10-CM

## 2018-04-12 DIAGNOSIS — E11.9 TYPE 2 DIABETES MELLITUS WITHOUT COMPLICATIONS: ICD-10-CM

## 2018-04-12 LAB
ALBUMIN SERPL ELPH-MCNC: 3.4 G/DL — SIGNIFICANT CHANGE UP (ref 3.3–5)
ALP SERPL-CCNC: 206 U/L — HIGH (ref 40–120)
ALT FLD-CCNC: 93 U/L RC — HIGH (ref 10–45)
ANION GAP SERPL CALC-SCNC: 16 MMOL/L — SIGNIFICANT CHANGE UP (ref 5–17)
AST SERPL-CCNC: 87 U/L — HIGH (ref 10–40)
BASOPHILS # BLD AUTO: 0 K/UL — SIGNIFICANT CHANGE UP (ref 0–0.2)
BASOPHILS NFR BLD AUTO: 0.2 % — SIGNIFICANT CHANGE UP (ref 0–2)
BILIRUB SERPL-MCNC: 0.8 MG/DL — SIGNIFICANT CHANGE UP (ref 0.2–1.2)
BUN SERPL-MCNC: 45 MG/DL — HIGH (ref 7–23)
CALCIUM SERPL-MCNC: 8.9 MG/DL — SIGNIFICANT CHANGE UP (ref 8.4–10.5)
CHLORIDE SERPL-SCNC: 90 MMOL/L — LOW (ref 96–108)
CO2 SERPL-SCNC: 26 MMOL/L — SIGNIFICANT CHANGE UP (ref 22–31)
CREAT SERPL-MCNC: 0.97 MG/DL — SIGNIFICANT CHANGE UP (ref 0.5–1.3)
EOSINOPHIL # BLD AUTO: 0.4 K/UL — SIGNIFICANT CHANGE UP (ref 0–0.5)
EOSINOPHIL NFR BLD AUTO: 4.5 % — SIGNIFICANT CHANGE UP (ref 0–6)
GLUCOSE BLDC GLUCOMTR-MCNC: 137 MG/DL — HIGH (ref 70–99)
GLUCOSE BLDC GLUCOMTR-MCNC: 163 MG/DL — HIGH (ref 70–99)
GLUCOSE BLDC GLUCOMTR-MCNC: 167 MG/DL — HIGH (ref 70–99)
GLUCOSE BLDC GLUCOMTR-MCNC: 170 MG/DL — HIGH (ref 70–99)
GLUCOSE SERPL-MCNC: 113 MG/DL — HIGH (ref 70–99)
HCT VFR BLD CALC: 41.3 % — SIGNIFICANT CHANGE UP (ref 34.5–45)
HGB BLD-MCNC: 13 G/DL — SIGNIFICANT CHANGE UP (ref 11.5–15.5)
LYMPHOCYTES # BLD AUTO: 2.8 K/UL — SIGNIFICANT CHANGE UP (ref 1–3.3)
LYMPHOCYTES # BLD AUTO: 29.3 % — SIGNIFICANT CHANGE UP (ref 13–44)
MAGNESIUM SERPL-MCNC: 2.5 MG/DL — SIGNIFICANT CHANGE UP (ref 1.6–2.6)
MCHC RBC-ENTMCNC: 28.9 PG — SIGNIFICANT CHANGE UP (ref 27–34)
MCHC RBC-ENTMCNC: 31.5 GM/DL — LOW (ref 32–36)
MCV RBC AUTO: 91.8 FL — SIGNIFICANT CHANGE UP (ref 80–100)
MONOCYTES # BLD AUTO: 1.2 K/UL — HIGH (ref 0–0.9)
MONOCYTES NFR BLD AUTO: 12.3 % — SIGNIFICANT CHANGE UP (ref 2–14)
NEUTROPHILS # BLD AUTO: 5 K/UL — SIGNIFICANT CHANGE UP (ref 1.8–7.4)
NEUTROPHILS NFR BLD AUTO: 53.6 % — SIGNIFICANT CHANGE UP (ref 43–77)
PHOSPHATE SERPL-MCNC: 3.7 MG/DL — SIGNIFICANT CHANGE UP (ref 2.5–4.5)
PLATELET # BLD AUTO: 166 K/UL — SIGNIFICANT CHANGE UP (ref 150–400)
POTASSIUM SERPL-MCNC: 3.9 MMOL/L — SIGNIFICANT CHANGE UP (ref 3.5–5.3)
POTASSIUM SERPL-SCNC: 3.9 MMOL/L — SIGNIFICANT CHANGE UP (ref 3.5–5.3)
PROT SERPL-MCNC: 7.8 G/DL — SIGNIFICANT CHANGE UP (ref 6–8.3)
RBC # BLD: 4.5 M/UL — SIGNIFICANT CHANGE UP (ref 3.8–5.2)
RBC # FLD: 13.3 % — SIGNIFICANT CHANGE UP (ref 10.3–14.5)
SODIUM SERPL-SCNC: 132 MMOL/L — LOW (ref 135–145)
WBC # BLD: 9.4 K/UL — SIGNIFICANT CHANGE UP (ref 3.8–10.5)
WBC # FLD AUTO: 9.4 K/UL — SIGNIFICANT CHANGE UP (ref 3.8–10.5)

## 2018-04-12 PROCEDURE — 93010 ELECTROCARDIOGRAM REPORT: CPT

## 2018-04-12 PROCEDURE — 71045 X-RAY EXAM CHEST 1 VIEW: CPT | Mod: 26

## 2018-04-12 PROCEDURE — 99233 SBSQ HOSP IP/OBS HIGH 50: CPT

## 2018-04-12 PROCEDURE — 99291 CRITICAL CARE FIRST HOUR: CPT

## 2018-04-12 RX ORDER — CLOPIDOGREL BISULFATE 75 MG/1
1 TABLET, FILM COATED ORAL
Qty: 30 | Refills: 0 | OUTPATIENT
Start: 2018-04-12 | End: 2018-05-11

## 2018-04-12 RX ORDER — ATORVASTATIN CALCIUM 80 MG/1
40 TABLET, FILM COATED ORAL AT BEDTIME
Qty: 0 | Refills: 0 | Status: DISCONTINUED | OUTPATIENT
Start: 2018-04-12 | End: 2018-04-13

## 2018-04-12 RX ORDER — LISINOPRIL 2.5 MG/1
1 TABLET ORAL
Qty: 30 | Refills: 0 | OUTPATIENT
Start: 2018-04-12 | End: 2018-05-11

## 2018-04-12 RX ORDER — AZITHROMYCIN 500 MG/1
1 TABLET, FILM COATED ORAL
Qty: 0 | Refills: 0 | COMMUNITY

## 2018-04-12 RX ORDER — METFORMIN HYDROCHLORIDE 850 MG/1
1 TABLET ORAL
Qty: 60 | Refills: 0 | OUTPATIENT
Start: 2018-04-12 | End: 2018-05-11

## 2018-04-12 RX ORDER — FUROSEMIDE 40 MG
40 TABLET ORAL
Qty: 0 | Refills: 0 | Status: DISCONTINUED | OUTPATIENT
Start: 2018-04-13 | End: 2018-04-13

## 2018-04-12 RX ORDER — ATORVASTATIN CALCIUM 80 MG/1
1 TABLET, FILM COATED ORAL
Qty: 30 | Refills: 0 | OUTPATIENT
Start: 2018-04-12 | End: 2018-05-11

## 2018-04-12 RX ORDER — SACUBITRIL AND VALSARTAN 24; 26 MG/1; MG/1
1 TABLET, FILM COATED ORAL
Qty: 60 | Refills: 0 | OUTPATIENT
Start: 2018-04-12 | End: 2018-05-11

## 2018-04-12 RX ORDER — METOPROLOL TARTRATE 50 MG
75 TABLET ORAL DAILY
Qty: 0 | Refills: 0 | Status: DISCONTINUED | OUTPATIENT
Start: 2018-04-13 | End: 2018-04-13

## 2018-04-12 RX ORDER — SIMETHICONE 80 MG/1
1 TABLET, CHEWABLE ORAL
Qty: 0 | Refills: 0 | COMMUNITY
Start: 2018-04-12

## 2018-04-12 RX ORDER — LOSARTAN/HYDROCHLOROTHIAZIDE 100MG-25MG
1 TABLET ORAL
Qty: 0 | Refills: 0 | COMMUNITY

## 2018-04-12 RX ORDER — METOPROLOL TARTRATE 50 MG
25 TABLET ORAL ONCE
Qty: 0 | Refills: 0 | Status: COMPLETED | OUTPATIENT
Start: 2018-04-12 | End: 2018-04-12

## 2018-04-12 RX ORDER — SENNA PLUS 8.6 MG/1
1 TABLET ORAL
Qty: 0 | Refills: 0 | COMMUNITY
Start: 2018-04-12

## 2018-04-12 RX ORDER — FUROSEMIDE 40 MG
1 TABLET ORAL
Qty: 30 | Refills: 0 | OUTPATIENT
Start: 2018-04-12 | End: 2018-05-11

## 2018-04-12 RX ORDER — METOPROLOL TARTRATE 50 MG
3 TABLET ORAL
Qty: 90 | Refills: 0 | OUTPATIENT
Start: 2018-04-12 | End: 2018-05-11

## 2018-04-12 RX ORDER — FUROSEMIDE 40 MG
40 TABLET ORAL ONCE
Qty: 0 | Refills: 0 | Status: COMPLETED | OUTPATIENT
Start: 2018-04-12 | End: 2018-04-12

## 2018-04-12 RX ORDER — PHENTERMINE HCL 30 MG
1 CAPSULE ORAL
Qty: 0 | Refills: 0 | COMMUNITY

## 2018-04-12 RX ORDER — ASPIRIN/CALCIUM CARB/MAGNESIUM 324 MG
1 TABLET ORAL
Qty: 0 | Refills: 0 | COMMUNITY
Start: 2018-04-12

## 2018-04-12 RX ORDER — SACUBITRIL AND VALSARTAN 24; 26 MG/1; MG/1
1 TABLET, FILM COATED ORAL
Qty: 30 | Refills: 0 | OUTPATIENT
Start: 2018-04-12 | End: 2018-05-11

## 2018-04-12 RX ORDER — LISINOPRIL 2.5 MG/1
5 TABLET ORAL DAILY
Qty: 0 | Refills: 0 | Status: DISCONTINUED | OUTPATIENT
Start: 2018-04-13 | End: 2018-04-13

## 2018-04-12 RX ORDER — DOCUSATE SODIUM 100 MG
1 CAPSULE ORAL
Qty: 0 | Refills: 0 | COMMUNITY
Start: 2018-04-12

## 2018-04-12 RX ADMIN — HEPARIN SODIUM 5000 UNIT(S): 5000 INJECTION INTRAVENOUS; SUBCUTANEOUS at 05:36

## 2018-04-12 RX ADMIN — SIMETHICONE 80 MILLIGRAM(S): 80 TABLET, CHEWABLE ORAL at 05:37

## 2018-04-12 RX ADMIN — CLOPIDOGREL BISULFATE 75 MILLIGRAM(S): 75 TABLET, FILM COATED ORAL at 13:32

## 2018-04-12 RX ADMIN — ATORVASTATIN CALCIUM 40 MILLIGRAM(S): 80 TABLET, FILM COATED ORAL at 21:23

## 2018-04-12 RX ADMIN — CAPTOPRIL 6.25 MILLIGRAM(S): 12.5 TABLET ORAL at 05:36

## 2018-04-12 RX ADMIN — Medication 1: at 13:33

## 2018-04-12 RX ADMIN — Medication 100 MILLIGRAM(S): at 18:09

## 2018-04-12 RX ADMIN — HEPARIN SODIUM 5000 UNIT(S): 5000 INJECTION INTRAVENOUS; SUBCUTANEOUS at 21:23

## 2018-04-12 RX ADMIN — Medication 1: at 09:15

## 2018-04-12 RX ADMIN — Medication 81 MILLIGRAM(S): at 13:32

## 2018-04-12 RX ADMIN — HEPARIN SODIUM 5000 UNIT(S): 5000 INJECTION INTRAVENOUS; SUBCUTANEOUS at 13:32

## 2018-04-12 RX ADMIN — Medication 40 MILLIGRAM(S): at 05:37

## 2018-04-12 RX ADMIN — Medication 25 MILLIGRAM(S): at 13:32

## 2018-04-12 RX ADMIN — SENNA PLUS 1 TABLET(S): 8.6 TABLET ORAL at 21:23

## 2018-04-12 RX ADMIN — Medication 1: at 18:09

## 2018-04-12 RX ADMIN — Medication 40 MILLIGRAM(S): at 15:55

## 2018-04-12 RX ADMIN — Medication 100 MILLIGRAM(S): at 05:37

## 2018-04-12 RX ADMIN — Medication 50 MILLIGRAM(S): at 05:36

## 2018-04-12 RX ADMIN — SIMETHICONE 80 MILLIGRAM(S): 80 TABLET, CHEWABLE ORAL at 18:09

## 2018-04-12 NOTE — PROGRESS NOTE ADULT - PROBLEM SELECTOR PLAN 2
Continue heart failure medications along with lasix 40mg po daily. Continue heart failure medications along with lasix 40mg po daily. Low sodium diet, restrict fluid intake <1.5liter/day, daily weights.

## 2018-04-12 NOTE — PHYSICAL THERAPY INITIAL EVALUATION ADULT - ASR EQUIP NEEDS DISCH PT EVAL
rolling walker (5 inch wheels) rolling walker (5 inch wheels)/Patient feel mores steady and confident in ambulation with rolling walker than without.

## 2018-04-12 NOTE — PROGRESS NOTE ADULT - SUBJECTIVE AND OBJECTIVE BOX
Patient is a 85y old  Female who presents with a chief complaint of NSTEMI (11 Apr 2018 00:58)    CRITICAL CARE NOTE: MEDICINE ATTENDING    SUBJECTIVE / OVERNIGHT EVENTS:  had episode of acute sob with O2 sat 93%, +JVD, b/l crackles on lung exam - ordered EKG, cxr, and Lasix 40mg iv x 1 - likely due to flash pulmonary edema       MEDICATIONS  (STANDING):  aspirin enteric coated 81 milliGRAM(s) Oral daily  atorvastatin 40 milliGRAM(s) Oral at bedtime  clopidogrel Tablet 75 milliGRAM(s) Oral daily  dextrose 5%. 1000 milliLiter(s) (50 mL/Hr) IV Continuous <Continuous>  dextrose 50% Injectable 12.5 Gram(s) IV Push once  dextrose 50% Injectable 25 Gram(s) IV Push once  dextrose 50% Injectable 25 Gram(s) IV Push once  docusate sodium 100 milliGRAM(s) Oral two times a day  heparin  Injectable 5000 Unit(s) SubCutaneous every 8 hours  insulin lispro (HumaLOG) corrective regimen sliding scale   SubCutaneous three times a day before meals  insulin lispro (HumaLOG) corrective regimen sliding scale   SubCutaneous at bedtime  senna 1 Tablet(s) Oral at bedtime  simethicone 80 milliGRAM(s) Chew two times a day    MEDICATIONS  (PRN):  dextrose Gel 1 Dose(s) Oral once PRN Blood Glucose LESS THAN 70 milliGRAM(s)/deciliter  glucagon  Injectable 1 milliGRAM(s) IntraMuscular once PRN Glucose LESS THAN 70 milligrams/deciliter      Vital Signs Last 24 Hrs  T(C): 36.2 (12 Apr 2018 15:36), Max: 36.7 (11 Apr 2018 21:05)  T(F): 97.1 (12 Apr 2018 15:36), Max: 98.1 (11 Apr 2018 21:05)  HR: 66 (12 Apr 2018 15:36) (66 - 96)  BP: 114/69 (12 Apr 2018 15:36) (103/67 - 130/80)  BP(mean): --  RR: 18 (12 Apr 2018 15:36) (16 - 19)  SpO2: 92% (12 Apr 2018 15:36) (92% - 98%)  CAPILLARY BLOOD GLUCOSE      POCT Blood Glucose.: 163 mg/dL (12 Apr 2018 17:54)  POCT Blood Glucose.: 167 mg/dL (12 Apr 2018 12:49)  POCT Blood Glucose.: 170 mg/dL (12 Apr 2018 08:39)  POCT Blood Glucose.: 112 mg/dL (11 Apr 2018 21:29)    I&O's Summary    11 Apr 2018 07:01  -  12 Apr 2018 07:00  --------------------------------------------------------  IN: 600 mL / OUT: 500 mL / NET: 100 mL    12 Apr 2018 07:01  -  12 Apr 2018 18:45  --------------------------------------------------------  IN: 480 mL / OUT: 0 mL / NET: 480 mL        PHYSICAL EXAM:  GENERAL: NAD  HEAD:  Atraumatic, Normocephalic  EYES: conjunctiva and sclera clear  NECK: No JVD  CHEST/LUNG: CTA b/l  HEART: S1 S2 RRR  ABDOMEN: +BS Soft, NT/ND  EXTREMITIES:  2+ DP Pulses, No c/c/e  NEUROLOGY: AAOx3, no focal deficits   SKIN: No rashes or lesions    LABS:                        13.0   9.4   )-----------( 166      ( 12 Apr 2018 06:47 )             41.3     04-12    132<L>  |  90<L>  |  45<H>  ----------------------------<  113<H>  3.9   |  26  |  0.97    Ca    8.9      12 Apr 2018 06:47  Phos  3.7     04-12  Mg     2.5     04-12    TPro  7.8  /  Alb  3.4  /  TBili  0.8  /  DBili  x   /  AST  87<H>  /  ALT  93<H>  /  AlkPhos  206<H>  04-12    PT/INR - ( 11 Apr 2018 05:33 )   PT: 13.0 sec;   INR: 1.19 ratio         PTT - ( 11 Apr 2018 05:33 )  PTT:28.5 sec          RADIOLOGY & ADDITIONAL TESTS:    Imaging Personally Reviewed:  Consultant(s) Notes Reviewed:    Care Discussed with Consultants/Other Providers:

## 2018-04-12 NOTE — PHYSICAL THERAPY INITIAL EVALUATION ADULT - PERTINENT HX OF CURRENT PROBLEM, REHAB EVAL
85 [place]-speaking woman with history of pre-diabetes and HTN presents as transfer from [facility] for NSTEMI and emergent LHC revealing triple vessel disease with RUBEN x2 to pLAD and IABP placement pending possible staged PCI.

## 2018-04-12 NOTE — PROGRESS NOTE ADULT - PROBLEM SELECTOR PLAN 2
-continue with ASA, plavix, statin  PAT on telemetry -increased metoprolol   d/c captopril, start lisinopril

## 2018-04-12 NOTE — PHYSICAL THERAPY INITIAL EVALUATION ADULT - ADDITIONAL COMMENTS
Pt lives in a private house with 6 steps to enter home and another flight up to patients bedroom. Patients lives with son which helps patient when needed. Patient did not require assistive devices PTA.

## 2018-04-12 NOTE — PROGRESS NOTE ADULT - ATTENDING COMMENTS
Thank you. Case discussed with hospitalist and son. Patient is medically optimized from a cardiology standpoint. Will follow up with myself in cardiology office April 18th, 2018.   Cardiology office number [phone].    Alin Treviño M.D, F.A.C.C  [facility]   [phone]

## 2018-04-12 NOTE — PROGRESS NOTE ADULT - ATTENDING COMMENTS
care discussed with son at bedside. critical care time - 35 minutes  Dr. M. Luke  [facility] Hospitalist  [phone]

## 2018-04-12 NOTE — PROGRESS NOTE ADULT - ASSESSMENT
86 y/o [place] speaking woman with history of HTN and pre-diabetes transferred from [place] for NSTEMI and emergent LHC/ [facility] transferred to floor now with acute systolic heart failure s/p IV lasix

## 2018-04-13 VITALS
RESPIRATION RATE: 19 BRPM | TEMPERATURE: 98 F | SYSTOLIC BLOOD PRESSURE: 105 MMHG | HEART RATE: 80 BPM | OXYGEN SATURATION: 94 % | DIASTOLIC BLOOD PRESSURE: 69 MMHG

## 2018-04-13 LAB
ALBUMIN SERPL ELPH-MCNC: 3.5 G/DL — SIGNIFICANT CHANGE UP (ref 3.3–5)
ALP SERPL-CCNC: 226 U/L — HIGH (ref 40–120)
ALT FLD-CCNC: 90 U/L RC — HIGH (ref 10–45)
ANION GAP SERPL CALC-SCNC: 14 MMOL/L — SIGNIFICANT CHANGE UP (ref 5–17)
AST SERPL-CCNC: 99 U/L — HIGH (ref 10–40)
BILIRUB SERPL-MCNC: 1 MG/DL — SIGNIFICANT CHANGE UP (ref 0.2–1.2)
BUN SERPL-MCNC: 34 MG/DL — HIGH (ref 7–23)
CALCIUM SERPL-MCNC: 8.5 MG/DL — SIGNIFICANT CHANGE UP (ref 8.4–10.5)
CHLORIDE SERPL-SCNC: 91 MMOL/L — LOW (ref 96–108)
CO2 SERPL-SCNC: 28 MMOL/L — SIGNIFICANT CHANGE UP (ref 22–31)
CREAT SERPL-MCNC: 0.86 MG/DL — SIGNIFICANT CHANGE UP (ref 0.5–1.3)
GLUCOSE BLDC GLUCOMTR-MCNC: 108 MG/DL — HIGH (ref 70–99)
GLUCOSE BLDC GLUCOMTR-MCNC: 169 MG/DL — HIGH (ref 70–99)
GLUCOSE SERPL-MCNC: 114 MG/DL — HIGH (ref 70–99)
POTASSIUM SERPL-MCNC: 3.8 MMOL/L — SIGNIFICANT CHANGE UP (ref 3.5–5.3)
POTASSIUM SERPL-SCNC: 3.8 MMOL/L — SIGNIFICANT CHANGE UP (ref 3.5–5.3)
PROT SERPL-MCNC: 7.1 G/DL — SIGNIFICANT CHANGE UP (ref 6–8.3)
SODIUM SERPL-SCNC: 133 MMOL/L — LOW (ref 135–145)

## 2018-04-13 PROCEDURE — 99239 HOSP IP/OBS DSCHRG MGMT >30: CPT

## 2018-04-13 RX ORDER — FUROSEMIDE 40 MG
3 TABLET ORAL
Qty: 180 | Refills: 0 | OUTPATIENT
Start: 2018-04-13 | End: 2018-05-12

## 2018-04-13 RX ORDER — FUROSEMIDE 40 MG
20 TABLET ORAL ONCE
Qty: 0 | Refills: 0 | Status: DISCONTINUED | OUTPATIENT
Start: 2018-04-13 | End: 2018-04-13

## 2018-04-13 RX ORDER — FUROSEMIDE 40 MG
60 TABLET ORAL
Qty: 0 | Refills: 0 | Status: DISCONTINUED | OUTPATIENT
Start: 2018-04-13 | End: 2018-04-13

## 2018-04-13 RX ADMIN — HEPARIN SODIUM 5000 UNIT(S): 5000 INJECTION INTRAVENOUS; SUBCUTANEOUS at 05:06

## 2018-04-13 RX ADMIN — SIMETHICONE 80 MILLIGRAM(S): 80 TABLET, CHEWABLE ORAL at 05:06

## 2018-04-13 RX ADMIN — Medication 100 MILLIGRAM(S): at 05:06

## 2018-04-13 RX ADMIN — Medication 40 MILLIGRAM(S): at 05:06

## 2018-04-13 RX ADMIN — LISINOPRIL 5 MILLIGRAM(S): 2.5 TABLET ORAL at 05:06

## 2018-04-13 RX ADMIN — CLOPIDOGREL BISULFATE 75 MILLIGRAM(S): 75 TABLET, FILM COATED ORAL at 09:51

## 2018-04-13 RX ADMIN — Medication 75 MILLIGRAM(S): at 05:06

## 2018-04-13 RX ADMIN — Medication 1: at 09:51

## 2018-04-13 RX ADMIN — Medication 81 MILLIGRAM(S): at 09:51

## 2018-04-13 NOTE — PROGRESS NOTE ADULT - SUBJECTIVE AND OBJECTIVE BOX
Patient is a 85y old  Female who presents with a chief complaint of NSTEMI (11 Apr 2018 00:58)        SUBJECTIVE / OVERNIGHT EVENTS:      MEDICATIONS  (STANDING):  aspirin enteric coated 81 milliGRAM(s) Oral daily  atorvastatin 40 milliGRAM(s) Oral at bedtime  clopidogrel Tablet 75 milliGRAM(s) Oral daily  dextrose 5%. 1000 milliLiter(s) (50 mL/Hr) IV Continuous <Continuous>  dextrose 50% Injectable 12.5 Gram(s) IV Push once  dextrose 50% Injectable 25 Gram(s) IV Push once  dextrose 50% Injectable 25 Gram(s) IV Push once  docusate sodium 100 milliGRAM(s) Oral two times a day  furosemide    Tablet 20 milliGRAM(s) Oral once  furosemide    Tablet 60 milliGRAM(s) Oral two times a day  heparin  Injectable 5000 Unit(s) SubCutaneous every 8 hours  insulin lispro (HumaLOG) corrective regimen sliding scale   SubCutaneous three times a day before meals  insulin lispro (HumaLOG) corrective regimen sliding scale   SubCutaneous at bedtime  lisinopril 5 milliGRAM(s) Oral daily  metoprolol succinate ER 75 milliGRAM(s) Oral daily  senna 1 Tablet(s) Oral at bedtime  simethicone 80 milliGRAM(s) Chew two times a day    MEDICATIONS  (PRN):  dextrose Gel 1 Dose(s) Oral once PRN Blood Glucose LESS THAN 70 milliGRAM(s)/deciliter  glucagon  Injectable 1 milliGRAM(s) IntraMuscular once PRN Glucose LESS THAN 70 milligrams/deciliter      Vital Signs Last 24 Hrs  T(C): 36.6 (13 Apr 2018 12:25), Max: 36.7 (13 Apr 2018 04:25)  T(F): 97.9 (13 Apr 2018 12:25), Max: 98 (13 Apr 2018 04:25)  HR: 80 (13 Apr 2018 12:25) (80 - 90)  BP: 105/69 (13 Apr 2018 12:25) (105/69 - 118/75)  BP(mean): --  RR: 19 (13 Apr 2018 12:25) (18 - 19)  SpO2: 94% (13 Apr 2018 12:25) (94% - 98%)  CAPILLARY BLOOD GLUCOSE      POCT Blood Glucose.: 108 mg/dL (13 Apr 2018 12:48)  POCT Blood Glucose.: 169 mg/dL (13 Apr 2018 08:57)  POCT Blood Glucose.: 137 mg/dL (12 Apr 2018 21:47)    I&O's Summary    12 Apr 2018 07:01  -  13 Apr 2018 07:00  --------------------------------------------------------  IN: 1020 mL / OUT: 0 mL / NET: 1020 mL    13 Apr 2018 07:01  -  13 Apr 2018 19:17  --------------------------------------------------------  IN: 120 mL / OUT: 0 mL / NET: 120 mL          PHYSICAL EXAM  GENERAL: NAD, well-developed  HEAD:  Atraumatic, Normocephalic  EYES: EOMI, PERRLA, conjunctiva and sclera clear  NECK: Supple, No JVD  CHEST/LUNG: Clear to auscultation bilaterally; No wheeze  HEART: Regular rate and rhythm; No murmurs, rubs, or gallops  ABDOMEN: Soft, Nontender, Nondistended; Bowel sounds present  EXTREMITIES:  2+ Peripheral Pulses, No clubbing, cyanosis, or edema  PSYCH: AAOx3  SKIN: No rashes or lesions    LABS:                        13.0   9.4   )-----------( 166      ( 12 Apr 2018 06:47 )             41.3     04-13    133<L>  |  91<L>  |  34<H>  ----------------------------<  114<H>  3.8   |  28  |  0.86    Ca    8.5      13 Apr 2018 06:42  Phos  3.7     04-12  Mg     2.5     04-12    TPro  7.1  /  Alb  3.5  /  TBili  1.0  /  DBili  x   /  AST  99<H>  /  ALT  90<H>  /  AlkPhos  226<H>  04-13              RADIOLOGY & ADDITIONAL TESTS:    Imaging Personally Reviewed:  Consultant(s) Notes Reviewed:    Care Discussed with Consultants/Other Providers: -Patient seen and examined on 4/13/18.       Patient is a 85y old  Female who presents with a chief complaint of NSTEMI (11 Apr 2018 00:58)        SUBJECTIVE / OVERNIGHT EVENTS:      MEDICATIONS  (STANDING):  aspirin enteric coated 81 milliGRAM(s) Oral daily  atorvastatin 40 milliGRAM(s) Oral at bedtime  clopidogrel Tablet 75 milliGRAM(s) Oral daily  dextrose 5%. 1000 milliLiter(s) (50 mL/Hr) IV Continuous <Continuous>  dextrose 50% Injectable 12.5 Gram(s) IV Push once  dextrose 50% Injectable 25 Gram(s) IV Push once  dextrose 50% Injectable 25 Gram(s) IV Push once  docusate sodium 100 milliGRAM(s) Oral two times a day  furosemide    Tablet 20 milliGRAM(s) Oral once  furosemide    Tablet 60 milliGRAM(s) Oral two times a day  heparin  Injectable 5000 Unit(s) SubCutaneous every 8 hours  insulin lispro (HumaLOG) corrective regimen sliding scale   SubCutaneous three times a day before meals  insulin lispro (HumaLOG) corrective regimen sliding scale   SubCutaneous at bedtime  lisinopril 5 milliGRAM(s) Oral daily  metoprolol succinate ER 75 milliGRAM(s) Oral daily  senna 1 Tablet(s) Oral at bedtime  simethicone 80 milliGRAM(s) Chew two times a day    MEDICATIONS  (PRN):  dextrose Gel 1 Dose(s) Oral once PRN Blood Glucose LESS THAN 70 milliGRAM(s)/deciliter  glucagon  Injectable 1 milliGRAM(s) IntraMuscular once PRN Glucose LESS THAN 70 milligrams/deciliter      Vital Signs Last 24 Hrs  T(C): 36.6 (13 Apr 2018 12:25), Max: 36.7 (13 Apr 2018 04:25)  T(F): 97.9 (13 Apr 2018 12:25), Max: 98 (13 Apr 2018 04:25)  HR: 80 (13 Apr 2018 12:25) (80 - 90)  BP: 105/69 (13 Apr 2018 12:25) (105/69 - 118/75)  BP(mean): --  RR: 19 (13 Apr 2018 12:25) (18 - 19)  SpO2: 94% (13 Apr 2018 12:25) (94% - 98%)  CAPILLARY BLOOD GLUCOSE      POCT Blood Glucose.: 108 mg/dL (13 Apr 2018 12:48)  POCT Blood Glucose.: 169 mg/dL (13 Apr 2018 08:57)  POCT Blood Glucose.: 137 mg/dL (12 Apr 2018 21:47)    I&O's Summary    12 Apr 2018 07:01  -  13 Apr 2018 07:00  --------------------------------------------------------  IN: 1020 mL / OUT: 0 mL / NET: 1020 mL    13 Apr 2018 07:01  -  13 Apr 2018 19:17  --------------------------------------------------------  IN: 120 mL / OUT: 0 mL / NET: 120 mL          PHYSICAL EXAM  GENERAL: NAD, well-developed  HEAD:  Atraumatic, Normocephalic  EYES: EOMI, PERRLA, conjunctiva and sclera clear  NECK: Supple, No JVD  CHEST/LUNG: Clear to auscultation bilaterally; No wheeze  HEART: Regular rate and rhythm; No murmurs, rubs, or gallops  ABDOMEN: Soft, Nontender, Nondistended; Bowel sounds present  EXTREMITIES:  2+ Peripheral Pulses, No clubbing, cyanosis, or edema  PSYCH: AAOx3  SKIN: No rashes or lesions    LABS:                        13.0   9.4   )-----------( 166      ( 12 Apr 2018 06:47 )             41.3     04-13    133<L>  |  91<L>  |  34<H>  ----------------------------<  114<H>  3.8   |  28  |  0.86    Ca    8.5      13 Apr 2018 06:42  Phos  3.7     04-12  Mg     2.5     04-12    TPro  7.1  /  Alb  3.5  /  TBili  1.0  /  DBili  x   /  AST  99<H>  /  ALT  90<H>  /  AlkPhos  226<H>  04-13              RADIOLOGY & ADDITIONAL TESTS:    Imaging Personally Reviewed:  Consultant(s) Notes Reviewed:    Care Discussed with Consultants/Other Providers: -Patient seen and examined on 4/13/18.       Patient is a 85y old  Female who presents with a chief complaint of NSTEMI (11 Apr 2018 00:58)        SUBJECTIVE / OVERNIGHT EVENTS: Patient says SOB is much improved. Ambulating without issue. She denies CP. She denies sleeping problems.       MEDICATIONS  (STANDING):  aspirin enteric coated 81 milliGRAM(s) Oral daily  atorvastatin 40 milliGRAM(s) Oral at bedtime  clopidogrel Tablet 75 milliGRAM(s) Oral daily  dextrose 5%. 1000 milliLiter(s) (50 mL/Hr) IV Continuous <Continuous>  dextrose 50% Injectable 12.5 Gram(s) IV Push once  dextrose 50% Injectable 25 Gram(s) IV Push once  dextrose 50% Injectable 25 Gram(s) IV Push once  docusate sodium 100 milliGRAM(s) Oral two times a day  furosemide    Tablet 20 milliGRAM(s) Oral once  furosemide    Tablet 60 milliGRAM(s) Oral two times a day  heparin  Injectable 5000 Unit(s) SubCutaneous every 8 hours  insulin lispro (HumaLOG) corrective regimen sliding scale   SubCutaneous three times a day before meals  insulin lispro (HumaLOG) corrective regimen sliding scale   SubCutaneous at bedtime  lisinopril 5 milliGRAM(s) Oral daily  metoprolol succinate ER 75 milliGRAM(s) Oral daily  senna 1 Tablet(s) Oral at bedtime  simethicone 80 milliGRAM(s) Chew two times a day    MEDICATIONS  (PRN):  dextrose Gel 1 Dose(s) Oral once PRN Blood Glucose LESS THAN 70 milliGRAM(s)/deciliter  glucagon  Injectable 1 milliGRAM(s) IntraMuscular once PRN Glucose LESS THAN 70 milligrams/deciliter      Vital Signs Last 24 Hrs  T(C): 36.6 (13 Apr 2018 12:25), Max: 36.7 (13 Apr 2018 04:25)  T(F): 97.9 (13 Apr 2018 12:25), Max: 98 (13 Apr 2018 04:25)  HR: 80 (13 Apr 2018 12:25) (80 - 90)  BP: 105/69 (13 Apr 2018 12:25) (105/69 - 118/75)  BP(mean): --  RR: 19 (13 Apr 2018 12:25) (18 - 19)  SpO2: 94% (13 Apr 2018 12:25) (94% - 98%)  CAPILLARY BLOOD GLUCOSE      POCT Blood Glucose.: 108 mg/dL (13 Apr 2018 12:48)  POCT Blood Glucose.: 169 mg/dL (13 Apr 2018 08:57)  POCT Blood Glucose.: 137 mg/dL (12 Apr 2018 21:47)    I&O's Summary    12 Apr 2018 07:01  -  13 Apr 2018 07:00  --------------------------------------------------------  IN: 1020 mL / OUT: 0 mL / NET: 1020 mL    13 Apr 2018 07:01  -  13 Apr 2018 19:17  --------------------------------------------------------  IN: 120 mL / OUT: 0 mL / NET: 120 mL          PHYSICAL EXAM:  GENERAL: NAD, well-developed  HEAD:  Atraumatic, Normocephalic  EYES: EOMI, PERRLA, conjunctiva and sclera clear  NECK: Supple, No JVD  CHEST/LUNG: Mild crackles in bases.   HEART: Regular rate and rhythm; No murmurs, rubs, or gallops  ABDOMEN: Soft, overweight, Nontender, Nondistended; Bowel sounds present  EXTREMITIES: No clubbing, cyanosis, or edema  PSYCH/Neuro: AAOx3. Non-focal.   SKIN: No rashes or lesions      LABS:                        13.0   9.4   )-----------( 166      ( 12 Apr 2018 06:47 )             41.3     04-13    133<L>  |  91<L>  |  34<H>  ----------------------------<  114<H>  3.8   |  28  |  0.86    Ca    8.5      13 Apr 2018 06:42  Phos  3.7     04-12  Mg     2.5     04-12    TPro  7.1  /  Alb  3.5  /  TBili  1.0  /  DBili  x   /  AST  99<H>  /  ALT  90<H>  /  AlkPhos  226<H>  04-13        Telemetry reviewed: Sinus 80-90's. PAC's.       RADIOLOGY & ADDITIONAL TESTS:    Imaging Personally Reviewed:  Consultant(s) Notes Reviewed: Cardio.    Care Discussed with Consultants/Other Providers: Dr. Treviño

## 2018-04-13 NOTE — PROGRESS NOTE ADULT - PROBLEM SELECTOR PLAN 4
-continue with SS insulin
DVT px lovenox 40mg sq daily  GI px: pepcid 20mg po daily
-continue with SS insulin
-C/w simethicone and bowel regimen.   -LFT's noted to be abnormal. Possibly due to the statin. C/w reduced dose of statin.   -Possibly high due to CHF and passive congestion of the liver too.   -Should have LFT's followed up outpatient and RUQ US outpatient if persists.

## 2018-04-13 NOTE — PROGRESS NOTE ADULT - PROBLEM SELECTOR PROBLEM 2
Heart failure with reduced ejection fraction, NYHA class II
NSTEMI (non-ST elevated myocardial infarction)
Systolic CHF, acute
Pain of upper abdomen
Coronary artery disease involving native coronary artery of native heart without angina pectoris

## 2018-04-13 NOTE — PROGRESS NOTE ADULT - PROBLEM SELECTOR PROBLEM 4
Prophylactic measure
Type 2 diabetes mellitus without complication, with long-term current use of insulin
Type 2 diabetes mellitus without complication, with long-term current use of insulin
Abdominal discomfort

## 2018-04-13 NOTE — PROGRESS NOTE ADULT - PROBLEM SELECTOR PLAN 1
-Continue ASA, plavix, statin   -Continue Toprol XL 50mg QD and transition captopril to lisinopril
Continue ASA 81mg po daily, plavix 75mg po daily, toprol xl 75mg po daily, decrease lipitor 40mg po daily.  D/C captopril and start entresto 24mg/26mg po BID.   Repeat TTE for evaluation of LV function in 3months.
requiring IV lasix today   will increase lasix to 40mg po bid for tomorrow. IV lasix as needed   patient was drinking lots of fluids - advised to restrict fluid intake to 1000cc/day
-continue with ASA, plavix, statin, and BB   -echocardiogram pending, f/u
-Symptoms appear stable and patient reports improvement overall.   -Will increase to lasix PO 60mg twice daily.   -C/w lisinopril 5mg daily.   -C/w metoprolol ER 75mg daily.  -DASH diabetic diet with fluid restriction to 1L per day.  -Possible Entresto outpatient and will need repeat TTE in 3 months outpatient.   -Cardio recs appreciated.

## 2018-04-13 NOTE — PROGRESS NOTE ADULT - ATTENDING COMMENTS
Naveen García MD  Division of [facility] Medicine  Cell: [phone]  Pager: [phone]  Office: [phone] -Patient seen and examined on 4/13/18.     Naveen García MD  Division of Hospital Medicine  Cell: [phone]  Pager: [phone]  Office: [phone]

## 2018-04-13 NOTE — PROGRESS NOTE ADULT - PROBLEM SELECTOR PROBLEM 1
NSTEMI (non-ST elevated myocardial infarction)
NSTEMI (non-ST elevated myocardial infarction)
Systolic CHF, acute
NSTEMI (non-ST elevated myocardial infarction)
Systolic CHF, acute

## 2018-04-13 NOTE — DIETITIAN INITIAL EVALUATION ADULT. - OTHER INFO
seen for consult: pt with questions regarding diabetes. pt did not have any question related to food or nutrition, spoke with son at bedside, admitted for STEMI, consuming ~70% breakfast and not much for lunch or dinner. son brought in soup last night and this morning, reinforced fluid restriction, denies nausea/vomit, denies difficulty chewing /swallow. since pt is not eating much during the day due to preferences, recommend ensure pudding to supplement. last BM yesterday afternoon . NKFA seen for consult: pt with questions regarding diabetes. pt did not have any question related to food or nutrition, spoke with son at bedside, admitted for STEMI, consuming ~70% breakfast and not much for lunch or dinner. son brought in soup last night and this morning, reinforced fluid restriction, denies nausea/vomit, denies difficulty chewing /swallow. since pt is not eating much during the day due to preferences, son will be preparing meals when she goes home.  recommend ensure pudding to supplement. last BM yesterday afternoon . NKFA

## 2018-04-13 NOTE — PROGRESS NOTE ADULT - PROBLEM SELECTOR PLAN 6
-heparin for DVT ppx
-heparin for DVT ppx
-Heparin SQ for DVT PPx.     7. Dispo: -DC home today with outpatient cardio and PMD follow up next week. PT aniyah says no skilled PT needs upon DC.

## 2018-04-13 NOTE — PROGRESS NOTE ADULT - PROBLEM SELECTOR PLAN 2
-C/w ASA and plavix and statin and metoprolol and lisinopril.  -DASH diabetic diet.  -Cardio recs appreciated.

## 2018-04-13 NOTE — DIETITIAN INITIAL EVALUATION ADULT. - SOURCE
patient/family/significant other/other (specify)/only available chart -this admission since 4/8/2018, son at bedside translated. pt primary language is Monegasque Creole

## 2018-04-13 NOTE — PROGRESS NOTE ADULT - ASSESSMENT
85 year old Creole speaking woman with PMH of HTN and pre-diabetes; transferred from [facility] for NSTEMI and emergent LHC/[facility] transferred to floors now with acute systolic heart failure getting diuresis.

## 2018-04-13 NOTE — DIETITIAN INITIAL EVALUATION ADULT. - FACTORS AFF FOOD INTAKE
used to [place] way of eating : large meal at noon and snacks for the rest of the day/Jehovah's witness/ethnic/cultural/personal food preferences

## 2018-04-13 NOTE — PROGRESS NOTE ADULT - PROBLEM SELECTOR PLAN 3
-patient feels bloated, will start on simethicone  -bowel regimen
Continue insulin
-continue with toprol and captopril   -f/u echo   -monitor daily weights  -appears compensated on exam
-C/w GENEVIEVE QAC/HS while inpatient.   -Can be put on metformin 500mg twice daily outpatient.  -DASH diabetic diet.

## 2018-04-14 DIAGNOSIS — E11.9 TYPE 2 DIABETES MELLITUS WITHOUT COMPLICATIONS: ICD-10-CM

## 2018-04-14 DIAGNOSIS — R10.9 UNSPECIFIED ABDOMINAL PAIN: ICD-10-CM

## 2018-04-14 DIAGNOSIS — I25.10 ATHEROSCLEROTIC HEART DISEASE OF NATIVE CORONARY ARTERY WITHOUT ANGINA PECTORIS: ICD-10-CM

## 2018-04-16 PROBLEM — Z00.00 ENCOUNTER FOR PREVENTIVE HEALTH EXAMINATION: Status: ACTIVE | Noted: 2018-04-16

## 2018-04-16 PROBLEM — R73.03 PREDIABETES: Chronic | Status: ACTIVE | Noted: 2018-04-08

## 2018-04-16 PROBLEM — I10 ESSENTIAL (PRIMARY) HYPERTENSION: Chronic | Status: ACTIVE | Noted: 2018-04-08

## 2018-04-18 ENCOUNTER — APPOINTMENT (OUTPATIENT)
Dept: CARDIOLOGY | Facility: CLINIC | Age: 83
End: 2018-04-18
Payer: MEDICAID

## 2018-04-18 VITALS
SYSTOLIC BLOOD PRESSURE: 130 MMHG | HEART RATE: 78 BPM | DIASTOLIC BLOOD PRESSURE: 80 MMHG | OXYGEN SATURATION: 93 % | HEIGHT: 62 IN

## 2018-04-18 VITALS — WEIGHT: 161 LBS | BODY MASS INDEX: 29.45 KG/M2

## 2018-04-18 DIAGNOSIS — I10 ESSENTIAL (PRIMARY) HYPERTENSION: ICD-10-CM

## 2018-04-18 DIAGNOSIS — I50.22 CHRONIC SYSTOLIC (CONGESTIVE) HEART FAILURE: ICD-10-CM

## 2018-04-18 DIAGNOSIS — Z86.39 PERSONAL HISTORY OF OTHER ENDOCRINE, NUTRITIONAL AND METABOLIC DISEASE: ICD-10-CM

## 2018-04-18 DIAGNOSIS — I25.10 ATHEROSCLEROTIC HEART DISEASE OF NATIVE CORONARY ARTERY W/OUT ANGINA PECTORIS: ICD-10-CM

## 2018-04-18 PROCEDURE — 93000 ELECTROCARDIOGRAM COMPLETE: CPT

## 2018-04-18 PROCEDURE — 99214 OFFICE O/P EST MOD 30 MIN: CPT

## 2018-04-18 RX ORDER — FUROSEMIDE 40 MG/1
40 TABLET ORAL
Qty: 1 | Refills: 0 | Status: ACTIVE | COMMUNITY
Start: 2018-04-18 | End: 1900-01-01

## 2018-04-18 RX ORDER — METOPROLOL SUCCINATE 50 MG/1
50 TABLET, EXTENDED RELEASE ORAL
Qty: 1 | Refills: 0 | Status: ACTIVE | COMMUNITY
Start: 2018-04-18 | End: 1900-01-01

## 2018-04-18 RX ORDER — ASPIRIN ENTERIC COATED TABLETS 81 MG 81 MG/1
81 TABLET, DELAYED RELEASE ORAL DAILY
Qty: 30 | Refills: 3 | Status: ACTIVE | COMMUNITY
Start: 2018-04-18 | End: 1900-01-01

## 2018-04-18 RX ORDER — FUROSEMIDE 40 MG/1
40 TABLET ORAL
Qty: 1 | Refills: 0 | Status: DISCONTINUED | COMMUNITY
Start: 2018-04-18 | End: 2018-04-18

## 2018-04-18 RX ORDER — FAMOTIDINE 20 MG/1
20 TABLET, FILM COATED ORAL
Qty: 1 | Refills: 2 | Status: COMPLETED | COMMUNITY
Start: 2018-04-18

## 2018-04-18 RX ORDER — CLOPIDOGREL BISULFATE 75 MG/1
75 TABLET, FILM COATED ORAL
Qty: 90 | Refills: 1 | Status: ACTIVE | COMMUNITY
Start: 2018-04-18 | End: 1900-01-01

## 2018-04-18 RX ORDER — SPIRONOLACTONE 25 MG/1
25 TABLET ORAL DAILY
Qty: 30 | Refills: 3 | Status: ACTIVE | COMMUNITY
Start: 2018-04-18 | End: 1900-01-01

## 2018-04-18 RX ORDER — ATORVASTATIN CALCIUM 40 MG/1
40 TABLET, FILM COATED ORAL
Qty: 30 | Refills: 0 | Status: ACTIVE | COMMUNITY
Start: 2018-04-18 | End: 1900-01-01

## 2018-05-23 PROCEDURE — 33967 INSERT I-AORT PERCUT DEVICE: CPT

## 2018-05-23 PROCEDURE — 83036 HEMOGLOBIN GLYCOSYLATED A1C: CPT

## 2018-05-23 PROCEDURE — 83735 ASSAY OF MAGNESIUM: CPT

## 2018-05-23 PROCEDURE — C9600: CPT | Mod: RC

## 2018-05-23 PROCEDURE — 82962 GLUCOSE BLOOD TEST: CPT

## 2018-05-23 PROCEDURE — C1889: CPT

## 2018-05-23 PROCEDURE — C1887: CPT

## 2018-05-23 PROCEDURE — 82553 CREATINE MB FRACTION: CPT

## 2018-05-23 PROCEDURE — 96374 THER/PROPH/DIAG INJ IV PUSH: CPT

## 2018-05-23 PROCEDURE — 71045 X-RAY EXAM CHEST 1 VIEW: CPT

## 2018-05-23 PROCEDURE — 84100 ASSAY OF PHOSPHORUS: CPT

## 2018-05-23 PROCEDURE — 99285 EMERGENCY DEPT VISIT HI MDM: CPT | Mod: 25

## 2018-05-23 PROCEDURE — 82550 ASSAY OF CK (CPK): CPT

## 2018-05-23 PROCEDURE — C9606: CPT | Mod: LD

## 2018-05-23 PROCEDURE — 84443 ASSAY THYROID STIM HORMONE: CPT

## 2018-05-23 PROCEDURE — 86901 BLOOD TYPING SEROLOGIC RH(D): CPT

## 2018-05-23 PROCEDURE — C1769: CPT

## 2018-05-23 PROCEDURE — 80048 BASIC METABOLIC PNL TOTAL CA: CPT

## 2018-05-23 PROCEDURE — 93321 DOPPLER ECHO F-UP/LMTD STD: CPT

## 2018-05-23 PROCEDURE — C1725: CPT

## 2018-05-23 PROCEDURE — C1874: CPT

## 2018-05-23 PROCEDURE — 85610 PROTHROMBIN TIME: CPT

## 2018-05-23 PROCEDURE — 80053 COMPREHEN METABOLIC PANEL: CPT

## 2018-05-23 PROCEDURE — 85730 THROMBOPLASTIN TIME PARTIAL: CPT

## 2018-05-23 PROCEDURE — 93458 L HRT ARTERY/VENTRICLE ANGIO: CPT | Mod: 59

## 2018-05-23 PROCEDURE — 84484 ASSAY OF TROPONIN QUANT: CPT

## 2018-05-23 PROCEDURE — 85027 COMPLETE CBC AUTOMATED: CPT

## 2018-05-23 PROCEDURE — 97161 PT EVAL LOW COMPLEX 20 MIN: CPT

## 2018-05-23 PROCEDURE — 86900 BLOOD TYPING SEROLOGIC ABO: CPT

## 2018-05-23 PROCEDURE — C8924: CPT

## 2018-05-23 PROCEDURE — 86850 RBC ANTIBODY SCREEN: CPT

## 2018-05-23 PROCEDURE — 80061 LIPID PANEL: CPT

## 2018-05-23 PROCEDURE — C1894: CPT

## 2018-05-23 PROCEDURE — 93005 ELECTROCARDIOGRAM TRACING: CPT

## 2018-05-23 PROCEDURE — C8929: CPT

## 2018-05-23 PROCEDURE — 76937 US GUIDE VASCULAR ACCESS: CPT

## 2018-05-30 ENCOUNTER — NON-APPOINTMENT (OUTPATIENT)
Age: 83
End: 2018-05-30

## 2018-05-30 ENCOUNTER — APPOINTMENT (OUTPATIENT)
Dept: CARDIOLOGY | Facility: CLINIC | Age: 83
End: 2018-05-30
Payer: MEDICAID

## 2018-05-30 VITALS
HEIGHT: 62 IN | DIASTOLIC BLOOD PRESSURE: 79 MMHG | SYSTOLIC BLOOD PRESSURE: 138 MMHG | BODY MASS INDEX: 29.44 KG/M2 | WEIGHT: 160 LBS | OXYGEN SATURATION: 97 %

## 2018-05-30 PROCEDURE — 93000 ELECTROCARDIOGRAM COMPLETE: CPT

## 2018-05-30 PROCEDURE — 99214 OFFICE O/P EST MOD 30 MIN: CPT

## 2018-05-31 RX ORDER — LISINOPRIL 10 MG/1
10 TABLET ORAL
Qty: 90 | Refills: 1 | Status: ACTIVE | COMMUNITY
Start: 2018-04-18 | End: 1900-01-01

## 2018-08-22 ENCOUNTER — APPOINTMENT (OUTPATIENT)
Dept: CARDIOLOGY | Facility: CLINIC | Age: 83
End: 2018-08-22
Payer: MEDICAID

## 2018-08-22 PROCEDURE — 99214 OFFICE O/P EST MOD 30 MIN: CPT

## 2018-08-22 PROCEDURE — 93000 ELECTROCARDIOGRAM COMPLETE: CPT

## 2020-01-10 NOTE — PHYSICAL THERAPY INITIAL EVALUATION ADULT - PATIENT/FAMILY AGREES WITH PLAN
yes/Home with no skilled PT needs
Skin normal color for race, warm, dry and intact. No evidence of rash.

## 2021-10-06 PROBLEM — I10 ESSENTIAL HYPERTENSION: Status: ACTIVE | Noted: 2018-04-18

## 2021-12-21 NOTE — PROGRESS NOTE ADULT - PROBLEM SELECTOR PROBLEM 3
Diabetes
Pain of upper abdomen
Systolic CHF, acute
Type 2 diabetes mellitus without complication, without long-term current use of insulin
no

## 2022-10-30 NOTE — PROGRESS NOTE ADULT - ASSESSMENT
[de-identified] : 2/16/22 NSR (HR 68 bpm), PVC 85F Creole-speaking with pre-DM and HTN presents for late presentation NSTEMI with LHC revealing triple vessel disease s/p RUBEN to pLAD and IABP placement for elevated LVEDP, s/p with staged RUBEN to RCA and removal of IABP. [de-identified] : 11/2/21-11/22/21 25 sx recorded that correlate with isolated PVCs. PVC burden 1 %. One episode of SVT lasting 14 sec.  [de-identified] :  [facility] Cardiac 3/22/2017 Coronary Ca score 0. Normal coronary arteries. Small hiatal hernia.  [de-identified] : 8/26/2020 Stress ECHO Rob, 7:52, 89% MPHR, 10.4 METS\par EF 55-65%. Equivocal/borderline ST wave changes in response to stress. No clinical or 2D echo evidence of exercise-induced ischemia at 89% MPHR.
